# Patient Record
Sex: FEMALE | Race: BLACK OR AFRICAN AMERICAN | NOT HISPANIC OR LATINO | URBAN - METROPOLITAN AREA
[De-identification: names, ages, dates, MRNs, and addresses within clinical notes are randomized per-mention and may not be internally consistent; named-entity substitution may affect disease eponyms.]

---

## 2018-02-22 ENCOUNTER — EMERGENCY (EMERGENCY)
Facility: HOSPITAL | Age: 58
LOS: 1 days | Discharge: HOME | End: 2018-02-22
Attending: EMERGENCY MEDICINE | Admitting: EMERGENCY MEDICINE
Payer: COMMERCIAL

## 2018-02-22 VITALS
DIASTOLIC BLOOD PRESSURE: 82 MMHG | RESPIRATION RATE: 16 BRPM | TEMPERATURE: 98 F | SYSTOLIC BLOOD PRESSURE: 132 MMHG | HEART RATE: 67 BPM | OXYGEN SATURATION: 100 %

## 2018-02-22 VITALS
HEART RATE: 70 BPM | OXYGEN SATURATION: 99 % | TEMPERATURE: 98 F | SYSTOLIC BLOOD PRESSURE: 161 MMHG | DIASTOLIC BLOOD PRESSURE: 90 MMHG | RESPIRATION RATE: 16 BRPM

## 2018-02-22 DIAGNOSIS — I10 ESSENTIAL (PRIMARY) HYPERTENSION: ICD-10-CM

## 2018-02-22 DIAGNOSIS — Z88.2 ALLERGY STATUS TO SULFONAMIDES: ICD-10-CM

## 2018-02-22 DIAGNOSIS — Z79.1 LONG TERM (CURRENT) USE OF NON-STEROIDAL ANTI-INFLAMMATORIES (NSAID): ICD-10-CM

## 2018-02-22 DIAGNOSIS — Z88.0 ALLERGY STATUS TO PENICILLIN: ICD-10-CM

## 2018-02-22 DIAGNOSIS — M79.602 PAIN IN LEFT ARM: ICD-10-CM

## 2018-02-22 DIAGNOSIS — R07.89 OTHER CHEST PAIN: ICD-10-CM

## 2018-02-22 DIAGNOSIS — Z91.013 ALLERGY TO SEAFOOD: ICD-10-CM

## 2018-02-22 LAB
ALBUMIN SERPL ELPH-MCNC: 4.2 G/DL — SIGNIFICANT CHANGE UP (ref 3.3–5)
ALP SERPL-CCNC: 82 U/L — SIGNIFICANT CHANGE UP (ref 40–120)
ALT FLD-CCNC: 8 U/L — LOW (ref 10–45)
ANION GAP SERPL CALC-SCNC: 8 MMOL/L — SIGNIFICANT CHANGE UP (ref 5–17)
AST SERPL-CCNC: 15 U/L — SIGNIFICANT CHANGE UP (ref 10–40)
BASOPHILS NFR BLD AUTO: 0.6 % — SIGNIFICANT CHANGE UP (ref 0–2)
BILIRUB SERPL-MCNC: 0.3 MG/DL — SIGNIFICANT CHANGE UP (ref 0.2–1.2)
BUN SERPL-MCNC: 13 MG/DL — SIGNIFICANT CHANGE UP (ref 7–23)
CALCIUM SERPL-MCNC: 9.4 MG/DL — SIGNIFICANT CHANGE UP (ref 8.4–10.5)
CHLORIDE SERPL-SCNC: 100 MMOL/L — SIGNIFICANT CHANGE UP (ref 96–108)
CK MB CFR SERPL CALC: 1.7 NG/ML — SIGNIFICANT CHANGE UP (ref 0–6.7)
CK SERPL-CCNC: 95 U/L — SIGNIFICANT CHANGE UP (ref 25–170)
CO2 SERPL-SCNC: 30 MMOL/L — SIGNIFICANT CHANGE UP (ref 22–31)
CREAT SERPL-MCNC: 0.74 MG/DL — SIGNIFICANT CHANGE UP (ref 0.5–1.3)
EOSINOPHIL NFR BLD AUTO: 3.1 % — SIGNIFICANT CHANGE UP (ref 0–6)
EXTRA BLUE TOP TUBE: SIGNIFICANT CHANGE UP
EXTRA SST TUBE: SIGNIFICANT CHANGE UP
GLUCOSE SERPL-MCNC: 94 MG/DL — SIGNIFICANT CHANGE UP (ref 70–99)
HCT VFR BLD CALC: 35.4 % — SIGNIFICANT CHANGE UP (ref 34.5–45)
HGB BLD-MCNC: 11.4 G/DL — LOW (ref 11.5–15.5)
LYMPHOCYTES # BLD AUTO: 32.9 % — SIGNIFICANT CHANGE UP (ref 13–44)
MCHC RBC-ENTMCNC: 22.8 PG — LOW (ref 27–34)
MCHC RBC-ENTMCNC: 32.2 G/DL — SIGNIFICANT CHANGE UP (ref 32–36)
MCV RBC AUTO: 70.9 FL — LOW (ref 80–100)
MONOCYTES NFR BLD AUTO: 5.4 % — SIGNIFICANT CHANGE UP (ref 2–14)
NEUTROPHILS NFR BLD AUTO: 58 % — SIGNIFICANT CHANGE UP (ref 43–77)
PLATELET # BLD AUTO: 259 K/UL — SIGNIFICANT CHANGE UP (ref 150–400)
POTASSIUM SERPL-MCNC: 4 MMOL/L — SIGNIFICANT CHANGE UP (ref 3.5–5.3)
POTASSIUM SERPL-SCNC: 4 MMOL/L — SIGNIFICANT CHANGE UP (ref 3.5–5.3)
PROT SERPL-MCNC: 7.5 G/DL — SIGNIFICANT CHANGE UP (ref 6–8.3)
RBC # BLD: 4.99 M/UL — SIGNIFICANT CHANGE UP (ref 3.8–5.2)
RBC # FLD: 14.6 % — SIGNIFICANT CHANGE UP (ref 10.3–16.9)
SODIUM SERPL-SCNC: 138 MMOL/L — SIGNIFICANT CHANGE UP (ref 135–145)
TROPONIN T SERPL-MCNC: <0.01 NG/ML — SIGNIFICANT CHANGE UP (ref 0–0.01)
WBC # BLD: 7 K/UL — SIGNIFICANT CHANGE UP (ref 3.8–10.5)
WBC # FLD AUTO: 7 K/UL — SIGNIFICANT CHANGE UP (ref 3.8–10.5)

## 2018-02-22 PROCEDURE — 99284 EMERGENCY DEPT VISIT MOD MDM: CPT | Mod: 25

## 2018-02-22 PROCEDURE — 85025 COMPLETE CBC W/AUTO DIFF WBC: CPT

## 2018-02-22 PROCEDURE — 80053 COMPREHEN METABOLIC PANEL: CPT

## 2018-02-22 PROCEDURE — 36415 COLL VENOUS BLD VENIPUNCTURE: CPT

## 2018-02-22 PROCEDURE — 82553 CREATINE MB FRACTION: CPT

## 2018-02-22 PROCEDURE — 86140 C-REACTIVE PROTEIN: CPT

## 2018-02-22 PROCEDURE — 84484 ASSAY OF TROPONIN QUANT: CPT

## 2018-02-22 PROCEDURE — 75574 CT ANGIO HRT W/3D IMAGE: CPT

## 2018-02-22 PROCEDURE — 99285 EMERGENCY DEPT VISIT HI MDM: CPT | Mod: 25

## 2018-02-22 PROCEDURE — 93005 ELECTROCARDIOGRAM TRACING: CPT

## 2018-02-22 PROCEDURE — 75574 CT ANGIO HRT W/3D IMAGE: CPT | Mod: 26

## 2018-02-22 PROCEDURE — 82550 ASSAY OF CK (CPK): CPT

## 2018-02-22 PROCEDURE — 93010 ELECTROCARDIOGRAM REPORT: CPT

## 2018-02-22 PROCEDURE — 85652 RBC SED RATE AUTOMATED: CPT

## 2018-02-22 RX ORDER — IBUPROFEN 200 MG
1 TABLET ORAL
Qty: 30 | Refills: 0
Start: 2018-02-22

## 2018-02-22 RX ORDER — AMLODIPINE BESYLATE 2.5 MG/1
1 TABLET ORAL
Qty: 30 | Refills: 0
Start: 2018-02-22 | End: 2018-03-23

## 2018-02-22 RX ORDER — METOPROLOL TARTRATE 50 MG
50 TABLET ORAL ONCE
Qty: 0 | Refills: 0 | Status: COMPLETED | OUTPATIENT
Start: 2018-02-22 | End: 2018-02-22

## 2018-02-22 RX ORDER — TRAMADOL HYDROCHLORIDE 50 MG/1
1 TABLET ORAL
Qty: 18 | Refills: 0
Start: 2018-02-22 | End: 2018-02-24

## 2018-02-22 RX ORDER — TRAMADOL HYDROCHLORIDE 50 MG/1
50 TABLET ORAL ONCE
Qty: 0 | Refills: 0 | Status: DISCONTINUED | OUTPATIENT
Start: 2018-02-22 | End: 2018-02-22

## 2018-02-22 RX ADMIN — Medication 50 MILLIGRAM(S): at 15:56

## 2018-02-22 RX ADMIN — TRAMADOL HYDROCHLORIDE 50 MILLIGRAM(S): 50 TABLET ORAL at 20:05

## 2018-02-22 NOTE — ED PROVIDER NOTE - OBJECTIVE STATEMENT
Pt w/ PMHx HTN, HPL, pre-DM, SLE vs RA, non compliant w/ all meds for approx 6 moths, PSHx ankle surgery, p/w 2-3 days of chest pain and L arm (shoulder / upper) pain. Pt reports the L arm pain is more frequent and uncomfortable. The pain is intermittent, worse with walking, laying down, and movement of the L arm. The pain is not pleuritic. The pain is squeezing in nature. The pain is 8/10 on exam. No analgesia taken. Pt reports associated mild SOB. No diaphoresis, lightheadedness, palpitations, n/v.

## 2018-02-22 NOTE — ED PROVIDER NOTE - PROGRESS NOTE DETAILS
No acute pathology on CT. Pt notified on incidental findings, given a copy of results, and instructed to f/u PCP, pulm. D/w pt at length importance of compliance w/ medications, regular medical care. Will start Norvasc for HTN. Instructed f/u PCP for further management of chronic medical conditions.

## 2018-02-22 NOTE — ED ADULT NURSE NOTE - OBJECTIVE STATEMENT
Pt presents to ED with c/o L sided chest pain radiating down L arm x2 days worsening today with some associated SOB. Denies dizziness/nv/sweating, no SOB noted on arrival to ED, speaking in full sentences. States she has been diagnosed with HTN but has not been compliant with medications. Denies fever/chills/cough. EKG done.

## 2018-02-22 NOTE — ED PROVIDER NOTE - MUSCULOSKELETAL, MLM
Range of motion is not limited. No signs of trauma to LUE / chest / upper back. ROM of the arm / shoulder reproduces some pain. Non tender. NVI distally. No swelling nor changes in calor / pallor

## 2018-02-22 NOTE — ED PROVIDER NOTE - CARE PLAN
Principal Discharge DX:	Chest pain, unspecified type  Secondary Diagnosis:	Pain of left upper extremity

## 2018-02-22 NOTE — ED PROVIDER NOTE - MEDICAL DECISION MAKING DETAILS
Pt p/w int chest and arm pain. Pain reproducible and worse w/ movement. Pt also, obese, multiple risk factors for CAD, and non compliant w/ meds. DDx includes MSK pain, RA / SLE flare, HTN-induced, angina, ACS, less likely other pathology. H&P not c/w dissection, PTX. No EKG findings to suggest pericardial effusion. H&P not c/w PE. Wells low risk. Exam not c/w DVT. Check labs, CTA coronary. Dispo pending w/u and clinical status

## 2018-07-23 NOTE — ED ADULT NURSE NOTE - CAS EDN DISCHARGE INTERVENTIONS
----- Message from CHEYENNE Lerma sent at 7/20/2018  4:06 PM CDT -----  Diabetes is well controlled.  Her labs are looking better.  Her kidney function has also improved from 58 to 75.    
Letter mailed to pt with results.  Pt to call the office if any questions.  
IV discontinued, cath removed intact

## 2020-12-27 PROBLEM — Z00.00 ENCOUNTER FOR PREVENTIVE HEALTH EXAMINATION: Status: ACTIVE | Noted: 2020-12-27

## 2020-12-30 ENCOUNTER — APPOINTMENT (OUTPATIENT)
Dept: BARIATRICS | Facility: CLINIC | Age: 60
End: 2020-12-30
Payer: COMMERCIAL

## 2020-12-30 VITALS
BODY MASS INDEX: 38.44 KG/M2 | HEART RATE: 79 BPM | OXYGEN SATURATION: 100 % | TEMPERATURE: 97.3 F | DIASTOLIC BLOOD PRESSURE: 83 MMHG | SYSTOLIC BLOOD PRESSURE: 153 MMHG | HEIGHT: 69 IN | WEIGHT: 259.5 LBS

## 2020-12-30 DIAGNOSIS — Z78.9 OTHER SPECIFIED HEALTH STATUS: ICD-10-CM

## 2020-12-30 DIAGNOSIS — E78.00 PURE HYPERCHOLESTEROLEMIA, UNSPECIFIED: ICD-10-CM

## 2020-12-30 DIAGNOSIS — I10 ESSENTIAL (PRIMARY) HYPERTENSION: ICD-10-CM

## 2020-12-30 DIAGNOSIS — J45.909 UNSPECIFIED ASTHMA, UNCOMPLICATED: ICD-10-CM

## 2020-12-30 DIAGNOSIS — E11.9 TYPE 2 DIABETES MELLITUS W/OUT COMPLICATIONS: ICD-10-CM

## 2020-12-30 PROCEDURE — 99203 OFFICE O/P NEW LOW 30 MIN: CPT

## 2020-12-30 PROCEDURE — 99072 ADDL SUPL MATRL&STAF TM PHE: CPT

## 2020-12-30 NOTE — ASSESSMENT
[FreeTextEntry1] : Will order UGI series and blood work, have her see our dietician and psych and then follow up after.

## 2020-12-30 NOTE — PHYSICAL EXAM
[Obese] : obese [Normal] : affect appropriate [de-identified] : gynoid distribution, bulk of weight in buttocks, hips and lower extremities, with narrow torso

## 2020-12-30 NOTE — ADDENDUM
[FreeTextEntry1] : This note was written by Ethel Dillard on 12/30/2020 acting as scribe for Dr. Salcido.

## 2020-12-30 NOTE — HISTORY OF PRESENT ILLNESS
[de-identified] : Maddi Solorzano is a 59 y/o F who works in  and comes because of severe obesity, which is exacerbating her early DM, HTN, HLD and most importantly, DJD in her knees, which has changed her from an active to an inactive lifestyle. She has been unable to maintain weight loss and has had weight gain, which has exacerbated knee pain and she's recently had bilateral knee injections. Has been told that weight loss is the best treatment for knee pain and with her gynoid distribution I think this is the best approach. Denies GERD. Denies drug and alcohol abuse. I think she would benefit from a sleeve gastrectomy. Current medications include albuterol for reactive airways, amlodipine for blood pressure and NSAIDs for her knees. On physical exam, patient has gynoid distribution with most weight in her buttocks, hips and lower extremities, but actually has a narrow torso. I think without intervention, Maddi is at a high risk for disability and I think weight loss surgery is probably much more likely to lead to long term success. Pt is s/p ankle surgery and states she has a plate in her R ankle. Will order UGI series and blood work, have her see our dietician and psych and then follow up after.

## 2020-12-30 NOTE — END OF VISIT
[FreeTextEntry3] : All medical record entries made by the Scribe were at my, Dr. Salcido's, discretion and personally dictated by me on 12/30/2020. I have reviewed the chart and agree that the record accurately reflects my personal performance of the history, physical exam, assessment and plan. I have also personally directed, reviewed and agreed to the chart.

## 2021-03-09 ENCOUNTER — APPOINTMENT (OUTPATIENT)
Dept: BARIATRICS | Facility: CLINIC | Age: 61
End: 2021-03-09
Payer: COMMERCIAL

## 2021-03-09 VITALS — WEIGHT: 256.8 LBS | HEIGHT: 69 IN | BODY MASS INDEX: 38.03 KG/M2

## 2021-03-09 PROCEDURE — 98968 PH1 ASSMT&MGMT NQHP 21-30: CPT

## 2021-03-29 ENCOUNTER — APPOINTMENT (OUTPATIENT)
Dept: BARIATRICS | Facility: CLINIC | Age: 61
End: 2021-03-29
Payer: COMMERCIAL

## 2021-03-29 ENCOUNTER — NON-APPOINTMENT (OUTPATIENT)
Age: 61
End: 2021-03-29

## 2021-03-29 VITALS — HEIGHT: 69 IN | BODY MASS INDEX: 38.06 KG/M2 | WEIGHT: 257 LBS

## 2021-03-29 PROCEDURE — 98968 PH1 ASSMT&MGMT NQHP 21-30: CPT

## 2021-03-31 LAB
25(OH)D3 SERPL-MCNC: 23.7 NG/ML
ALBUMIN SERPL ELPH-MCNC: 3.8 G/DL
ALP BLD-CCNC: 89 U/L
ALT SERPL-CCNC: 12 U/L
ANION GAP SERPL CALC-SCNC: 13 MMOL/L
AST SERPL-CCNC: 14 U/L
BASOPHILS # BLD AUTO: 0.04 K/UL
BASOPHILS NFR BLD AUTO: 0.7 %
BILIRUB SERPL-MCNC: 0.4 MG/DL
BUN SERPL-MCNC: 14 MG/DL
CALCIUM SERPL-MCNC: 9.4 MG/DL
CHLORIDE SERPL-SCNC: 107 MMOL/L
CO2 SERPL-SCNC: 24 MMOL/L
CREAT SERPL-MCNC: 0.69 MG/DL
CRP SERPL HS-MCNC: 1.22 MG/L
EOSINOPHIL # BLD AUTO: 0.16 K/UL
EOSINOPHIL NFR BLD AUTO: 2.8 %
ESTIMATED AVERAGE GLUCOSE: 120 MG/DL
FOLATE SERPL-MCNC: 7.1 NG/ML
GLUCOSE BS SERPL-MCNC: 97 MG/DL
GLUCOSE SERPL-MCNC: 101 MG/DL
HBA1C MFR BLD HPLC: 5.8 %
HCT VFR BLD CALC: 37.1 %
HGB BLD-MCNC: 11.4 G/DL
IMM GRANULOCYTES NFR BLD AUTO: 0.2 %
INSULIN P FAST SERPL-ACNC: 7.1 UU/ML
IRON SERPL-MCNC: 52 UG/DL
LYMPHOCYTES # BLD AUTO: 1.74 K/UL
LYMPHOCYTES NFR BLD AUTO: 31 %
MAN DIFF?: NORMAL
MCHC RBC-ENTMCNC: 22.9 PG
MCHC RBC-ENTMCNC: 30.7 GM/DL
MCV RBC AUTO: 74.5 FL
MONOCYTES # BLD AUTO: 0.35 K/UL
MONOCYTES NFR BLD AUTO: 6.2 %
NEUTROPHILS # BLD AUTO: 3.32 K/UL
NEUTROPHILS NFR BLD AUTO: 59.1 %
PLATELET # BLD AUTO: 239 K/UL
POTASSIUM SERPL-SCNC: 3.9 MMOL/L
PROT SERPL-MCNC: 6.6 G/DL
RBC # BLD: 4.98 M/UL
RBC # FLD: 14.6 %
SODIUM SERPL-SCNC: 145 MMOL/L
TSH SERPL-ACNC: 2.06 UIU/ML
VIT B12 SERPL-MCNC: 244 PG/ML
WBC # FLD AUTO: 5.62 K/UL

## 2021-04-07 LAB — VIT B1 SERPL-MCNC: 67.5 NMOL/L

## 2021-04-08 LAB — VIT A SERPL-MCNC: 30.8 UG/DL

## 2021-04-12 RX ORDER — ERGOCALCIFEROL 1.25 MG/1
1.25 MG CAPSULE, LIQUID FILLED ORAL
Qty: 12 | Refills: 0 | Status: ACTIVE | COMMUNITY
Start: 2021-04-12 | End: 1900-01-01

## 2021-04-22 ENCOUNTER — RESULT REVIEW (OUTPATIENT)
Age: 61
End: 2021-04-22

## 2021-04-22 ENCOUNTER — OUTPATIENT (OUTPATIENT)
Dept: OUTPATIENT SERVICES | Facility: HOSPITAL | Age: 61
LOS: 1 days | End: 2021-04-22
Payer: COMMERCIAL

## 2021-04-22 PROCEDURE — 71046 X-RAY EXAM CHEST 2 VIEWS: CPT | Mod: 26

## 2021-04-22 PROCEDURE — 71046 X-RAY EXAM CHEST 2 VIEWS: CPT

## 2021-04-28 ENCOUNTER — APPOINTMENT (OUTPATIENT)
Dept: BARIATRICS | Facility: CLINIC | Age: 61
End: 2021-04-28
Payer: COMMERCIAL

## 2021-04-28 VITALS — HEIGHT: 69 IN | BODY MASS INDEX: 38.23 KG/M2 | WEIGHT: 258.13 LBS

## 2021-04-28 PROCEDURE — 99072 ADDL SUPL MATRL&STAF TM PHE: CPT

## 2021-04-28 PROCEDURE — 99213 OFFICE O/P EST LOW 20 MIN: CPT

## 2021-04-28 NOTE — HISTORY OF PRESENT ILLNESS
[Home] : at home, [unfilled] , at the time of the visit. [Medical Office: ([facility])___] : at the medical office located in  [Verbal consent obtained from patient] : the patient, [unfilled] [de-identified] : Ms. Solorzano presents today for a final visit for surgery scheduled on 5/11/21. Alternatives, risks and benefits discussed. All questions answered and preop instructions given. BSTOP, use and rationale of non-opioid medications for pain management explained, patient verbalized understanding.

## 2021-04-28 NOTE — ASSESSMENT
[FreeTextEntry1] : 61 year old female presenting for a final visit for sleeve gastrectomy scheduled on 5/11. All questions answered and preop instructions given.

## 2021-04-29 ENCOUNTER — EMERGENCY (EMERGENCY)
Facility: HOSPITAL | Age: 61
LOS: 1 days | Discharge: HOME | End: 2021-04-29
Admitting: EMERGENCY MEDICINE
Payer: COMMERCIAL

## 2021-04-29 VITALS
DIASTOLIC BLOOD PRESSURE: 98 MMHG | OXYGEN SATURATION: 99 % | SYSTOLIC BLOOD PRESSURE: 164 MMHG | RESPIRATION RATE: 16 BRPM | TEMPERATURE: 98 F | HEART RATE: 76 BPM

## 2021-04-29 LAB — SARS-COV-2 RNA SPEC QL NAA+PROBE: SIGNIFICANT CHANGE UP

## 2021-04-29 PROCEDURE — U0003: CPT

## 2021-04-29 PROCEDURE — U0005: CPT

## 2021-04-29 PROCEDURE — 99283 EMERGENCY DEPT VISIT LOW MDM: CPT

## 2021-04-29 PROCEDURE — 99282 EMERGENCY DEPT VISIT SF MDM: CPT

## 2021-04-29 NOTE — ED PROVIDER NOTE - PATIENT PORTAL LINK FT
You can access the FollowMyHealth Patient Portal offered by [facility] by registering at the following website: [email or web address]. By joining [employer]’s FollowMyHealth portal, you will also be able to view your health information using other applications (apps) compatible with our system.

## 2021-05-01 DIAGNOSIS — Z88.8 ALLERGY STATUS TO OTHER DRUGS, MEDICAMENTS AND BIOLOGICAL SUBSTANCES: ICD-10-CM

## 2021-05-01 DIAGNOSIS — Z20.822 CONTACT WITH AND (SUSPECTED) EXPOSURE TO COVID-19: ICD-10-CM

## 2021-05-01 DIAGNOSIS — Z88.0 ALLERGY STATUS TO PENICILLIN: ICD-10-CM

## 2021-05-01 DIAGNOSIS — Z91.013 ALLERGY TO SEAFOOD: ICD-10-CM

## 2021-05-01 DIAGNOSIS — Z88.2 ALLERGY STATUS TO SULFONAMIDES: ICD-10-CM

## 2021-05-03 ENCOUNTER — APPOINTMENT (OUTPATIENT)
Dept: PULMONOLOGY | Facility: CLINIC | Age: 61
End: 2021-05-03
Payer: COMMERCIAL

## 2021-05-03 ENCOUNTER — NON-APPOINTMENT (OUTPATIENT)
Age: 61
End: 2021-05-03

## 2021-05-03 VITALS
OXYGEN SATURATION: 98 % | WEIGHT: 258 LBS | HEIGHT: 69 IN | DIASTOLIC BLOOD PRESSURE: 88 MMHG | BODY MASS INDEX: 38.21 KG/M2 | HEART RATE: 87 BPM | TEMPERATURE: 98 F | SYSTOLIC BLOOD PRESSURE: 108 MMHG

## 2021-05-03 DIAGNOSIS — G47.33 OBSTRUCTIVE SLEEP APNEA (ADULT) (PEDIATRIC): ICD-10-CM

## 2021-05-03 DIAGNOSIS — R91.1 SOLITARY PULMONARY NODULE: ICD-10-CM

## 2021-05-03 PROCEDURE — 99072 ADDL SUPL MATRL&STAF TM PHE: CPT

## 2021-05-03 PROCEDURE — 94729 DIFFUSING CAPACITY: CPT

## 2021-05-03 PROCEDURE — 99205 OFFICE O/P NEW HI 60 MIN: CPT | Mod: 25

## 2021-05-03 PROCEDURE — 94060 EVALUATION OF WHEEZING: CPT

## 2021-05-03 PROCEDURE — 94727 GAS DIL/WSHOT DETER LNG VOL: CPT

## 2021-05-03 RX ORDER — HYDROXYCHLOROQUINE SULFATE 200 MG/1
TABLET ORAL
Refills: 0 | Status: ACTIVE | COMMUNITY

## 2021-05-03 RX ORDER — ALBUTEROL SULFATE 90 UG/1
108 (90 BASE) INHALANT RESPIRATORY (INHALATION)
Qty: 1 | Refills: 0 | Status: ACTIVE | COMMUNITY
Start: 2021-05-03 | End: 1900-01-01

## 2021-05-03 RX ORDER — MELOXICAM 15 MG/1
TABLET ORAL
Refills: 0 | Status: ACTIVE | COMMUNITY

## 2021-05-03 RX ORDER — EZETIMIBE 10 MG/1
10 TABLET ORAL
Qty: 90 | Refills: 0 | Status: ACTIVE | COMMUNITY
Start: 2021-04-28

## 2021-05-03 NOTE — PHYSICAL EXAM
[No Acute Distress] : no acute distress [Normal Rate/Rhythm] : normal rate/rhythm [Normal S1, S2] : normal s1, s2 [No Murmurs] : no murmurs [No Resp Distress] : no resp distress [Clear to Auscultation Bilaterally] : clear to auscultation bilaterally [No Clubbing] : no clubbing [No Edema] : no edema

## 2021-05-08 ENCOUNTER — LABORATORY RESULT (OUTPATIENT)
Age: 61
End: 2021-05-08

## 2021-05-10 ENCOUNTER — TRANSCRIPTION ENCOUNTER (OUTPATIENT)
Age: 61
End: 2021-05-10

## 2021-05-10 VITALS
SYSTOLIC BLOOD PRESSURE: 134 MMHG | TEMPERATURE: 97 F | DIASTOLIC BLOOD PRESSURE: 77 MMHG | OXYGEN SATURATION: 97 % | WEIGHT: 255.07 LBS | RESPIRATION RATE: 16 BRPM | HEIGHT: 68 IN | HEART RATE: 83 BPM

## 2021-05-11 ENCOUNTER — INPATIENT (INPATIENT)
Facility: HOSPITAL | Age: 61
LOS: 1 days | Discharge: HOME | DRG: 621 | End: 2021-05-13
Attending: SURGERY | Admitting: SURGERY
Payer: COMMERCIAL

## 2021-05-11 ENCOUNTER — APPOINTMENT (OUTPATIENT)
Dept: BARIATRICS | Facility: HOSPITAL | Age: 61
End: 2021-05-11

## 2021-05-11 ENCOUNTER — RESULT REVIEW (OUTPATIENT)
Age: 61
End: 2021-05-11

## 2021-05-11 DIAGNOSIS — Z98.890 OTHER SPECIFIED POSTPROCEDURAL STATES: Chronic | ICD-10-CM

## 2021-05-11 LAB
HBV SURFACE AB SER-ACNC: SIGNIFICANT CHANGE UP
HBV SURFACE AG SER-ACNC: SIGNIFICANT CHANGE UP
HCT VFR BLD CALC: 36.1 % — SIGNIFICANT CHANGE UP (ref 34.5–45)
HCV AB S/CO SERPL IA: 0.04 S/CO — SIGNIFICANT CHANGE UP
HCV AB SERPL-IMP: SIGNIFICANT CHANGE UP
HGB BLD-MCNC: 11 G/DL — LOW (ref 11.5–15.5)
HIV 1+2 AB+HIV1 P24 AG SERPL QL IA: SIGNIFICANT CHANGE UP
MCHC RBC-ENTMCNC: 22.2 PG — LOW (ref 27–34)
MCHC RBC-ENTMCNC: 30.5 GM/DL — LOW (ref 32–36)
MCV RBC AUTO: 72.9 FL — LOW (ref 80–100)
NRBC # BLD: 0 /100 WBCS — SIGNIFICANT CHANGE UP (ref 0–0)
PLATELET # BLD AUTO: 234 K/UL — SIGNIFICANT CHANGE UP (ref 150–400)
RBC # BLD: 4.95 M/UL — SIGNIFICANT CHANGE UP (ref 3.8–5.2)
RBC # FLD: 14.5 % — SIGNIFICANT CHANGE UP (ref 10.3–14.5)
WBC # BLD: 9.21 K/UL — SIGNIFICANT CHANGE UP (ref 3.8–10.5)
WBC # FLD AUTO: 9.21 K/UL — SIGNIFICANT CHANGE UP (ref 3.8–10.5)

## 2021-05-11 PROCEDURE — 88307 TISSUE EXAM BY PATHOLOGIST: CPT | Mod: 26

## 2021-05-11 PROCEDURE — 39540 REPAIR OF DIAPHRAGM HERNIA: CPT

## 2021-05-11 PROCEDURE — 43775 LAP SLEEVE GASTRECTOMY: CPT

## 2021-05-11 RX ORDER — ONDANSETRON 8 MG/1
4 TABLET, FILM COATED ORAL EVERY 6 HOURS
Refills: 0 | Status: DISCONTINUED | OUTPATIENT
Start: 2021-05-11 | End: 2021-05-13

## 2021-05-11 RX ORDER — ACETAMINOPHEN 500 MG
1000 TABLET ORAL ONCE
Refills: 0 | Status: COMPLETED | OUTPATIENT
Start: 2021-05-12 | End: 2021-05-12

## 2021-05-11 RX ORDER — KETOROLAC TROMETHAMINE 30 MG/ML
15 SYRINGE (ML) INJECTION EVERY 6 HOURS
Refills: 0 | Status: DISCONTINUED | OUTPATIENT
Start: 2021-05-11 | End: 2021-05-13

## 2021-05-11 RX ORDER — HYDROMORPHONE HYDROCHLORIDE 2 MG/ML
0.25 INJECTION INTRAMUSCULAR; INTRAVENOUS; SUBCUTANEOUS ONCE
Refills: 0 | Status: DISCONTINUED | OUTPATIENT
Start: 2021-05-11 | End: 2021-05-11

## 2021-05-11 RX ORDER — SCOPALAMINE 1 MG/3D
1 PATCH, EXTENDED RELEASE TRANSDERMAL ONCE
Refills: 0 | Status: COMPLETED | OUTPATIENT
Start: 2021-05-11 | End: 2021-05-11

## 2021-05-11 RX ORDER — ACETAMINOPHEN 500 MG
650 TABLET ORAL EVERY 6 HOURS
Refills: 0 | Status: DISCONTINUED | OUTPATIENT
Start: 2021-05-11 | End: 2021-05-13

## 2021-05-11 RX ORDER — GABAPENTIN 400 MG/1
300 CAPSULE ORAL ONCE
Refills: 0 | Status: COMPLETED | OUTPATIENT
Start: 2021-05-11 | End: 2021-05-11

## 2021-05-11 RX ORDER — ACETAMINOPHEN 500 MG
1000 TABLET ORAL ONCE
Refills: 0 | Status: COMPLETED | OUTPATIENT
Start: 2021-05-11 | End: 2021-05-11

## 2021-05-11 RX ORDER — ALBUTEROL 90 UG/1
2 AEROSOL, METERED ORAL EVERY 6 HOURS
Refills: 0 | Status: DISCONTINUED | OUTPATIENT
Start: 2021-05-11 | End: 2021-05-13

## 2021-05-11 RX ORDER — ONDANSETRON 8 MG/1
4 TABLET, FILM COATED ORAL ONCE
Refills: 0 | Status: COMPLETED | OUTPATIENT
Start: 2021-05-11 | End: 2021-05-11

## 2021-05-11 RX ORDER — ENOXAPARIN SODIUM 100 MG/ML
40 INJECTION SUBCUTANEOUS ONCE
Refills: 0 | Status: COMPLETED | OUTPATIENT
Start: 2021-05-11 | End: 2021-05-11

## 2021-05-11 RX ORDER — ALBUTEROL 90 UG/1
2.5 AEROSOL, METERED ORAL ONCE
Refills: 0 | Status: COMPLETED | OUTPATIENT
Start: 2021-05-11 | End: 2021-05-11

## 2021-05-11 RX ORDER — SODIUM CHLORIDE 9 MG/ML
1000 INJECTION, SOLUTION INTRAVENOUS
Refills: 0 | Status: DISCONTINUED | OUTPATIENT
Start: 2021-05-11 | End: 2021-05-12

## 2021-05-11 RX ORDER — HYDROMORPHONE HYDROCHLORIDE 2 MG/ML
0.5 INJECTION INTRAMUSCULAR; INTRAVENOUS; SUBCUTANEOUS
Refills: 0 | Status: DISCONTINUED | OUTPATIENT
Start: 2021-05-11 | End: 2021-05-11

## 2021-05-11 RX ORDER — AMLODIPINE BESYLATE 2.5 MG/1
5 TABLET ORAL DAILY
Refills: 0 | Status: DISCONTINUED | OUTPATIENT
Start: 2021-05-11 | End: 2021-05-13

## 2021-05-11 RX ORDER — METOCLOPRAMIDE HCL 10 MG
10 TABLET ORAL ONCE
Refills: 0 | Status: COMPLETED | OUTPATIENT
Start: 2021-05-11 | End: 2021-05-11

## 2021-05-11 RX ORDER — HYDROMORPHONE HYDROCHLORIDE 2 MG/ML
0.25 INJECTION INTRAMUSCULAR; INTRAVENOUS; SUBCUTANEOUS ONCE
Refills: 0 | Status: DISCONTINUED | OUTPATIENT
Start: 2021-05-11 | End: 2021-05-12

## 2021-05-11 RX ORDER — PANTOPRAZOLE SODIUM 20 MG/1
40 TABLET, DELAYED RELEASE ORAL DAILY
Refills: 0 | Status: DISCONTINUED | OUTPATIENT
Start: 2021-05-11 | End: 2021-05-13

## 2021-05-11 RX ADMIN — Medication 1000 MILLIGRAM(S): at 21:20

## 2021-05-11 RX ADMIN — PANTOPRAZOLE SODIUM 40 MILLIGRAM(S): 20 TABLET, DELAYED RELEASE ORAL at 11:00

## 2021-05-11 RX ADMIN — Medication 15 MILLIGRAM(S): at 21:04

## 2021-05-11 RX ADMIN — Medication 1000 MILLIGRAM(S): at 07:38

## 2021-05-11 RX ADMIN — Medication 400 MILLIGRAM(S): at 21:04

## 2021-05-11 RX ADMIN — ENOXAPARIN SODIUM 40 MILLIGRAM(S): 100 INJECTION SUBCUTANEOUS at 07:38

## 2021-05-11 RX ADMIN — ALBUTEROL 2.5 MILLIGRAM(S): 90 AEROSOL, METERED ORAL at 07:39

## 2021-05-11 RX ADMIN — HYDROMORPHONE HYDROCHLORIDE 0.25 MILLIGRAM(S): 2 INJECTION INTRAMUSCULAR; INTRAVENOUS; SUBCUTANEOUS at 16:54

## 2021-05-11 RX ADMIN — Medication 1000 MILLIGRAM(S): at 15:03

## 2021-05-11 RX ADMIN — Medication 10 MILLIGRAM(S): at 19:24

## 2021-05-11 RX ADMIN — HYDROMORPHONE HYDROCHLORIDE 0.5 MILLIGRAM(S): 2 INJECTION INTRAMUSCULAR; INTRAVENOUS; SUBCUTANEOUS at 11:15

## 2021-05-11 RX ADMIN — Medication 15 MILLIGRAM(S): at 21:20

## 2021-05-11 RX ADMIN — SCOPALAMINE 1 PATCH: 1 PATCH, EXTENDED RELEASE TRANSDERMAL at 18:32

## 2021-05-11 RX ADMIN — HYDROMORPHONE HYDROCHLORIDE 0.5 MILLIGRAM(S): 2 INJECTION INTRAMUSCULAR; INTRAVENOUS; SUBCUTANEOUS at 12:10

## 2021-05-11 RX ADMIN — SCOPALAMINE 1 PATCH: 1 PATCH, EXTENDED RELEASE TRANSDERMAL at 07:39

## 2021-05-11 RX ADMIN — Medication 400 MILLIGRAM(S): at 14:33

## 2021-05-11 RX ADMIN — ONDANSETRON 4 MILLIGRAM(S): 8 TABLET, FILM COATED ORAL at 11:01

## 2021-05-11 RX ADMIN — HYDROMORPHONE HYDROCHLORIDE 0.25 MILLIGRAM(S): 2 INJECTION INTRAMUSCULAR; INTRAVENOUS; SUBCUTANEOUS at 16:24

## 2021-05-11 RX ADMIN — ONDANSETRON 4 MILLIGRAM(S): 8 TABLET, FILM COATED ORAL at 16:04

## 2021-05-11 RX ADMIN — ONDANSETRON 4 MILLIGRAM(S): 8 TABLET, FILM COATED ORAL at 11:42

## 2021-05-11 RX ADMIN — GABAPENTIN 300 MILLIGRAM(S): 400 CAPSULE ORAL at 07:38

## 2021-05-11 RX ADMIN — HYDROMORPHONE HYDROCHLORIDE 0.5 MILLIGRAM(S): 2 INJECTION INTRAMUSCULAR; INTRAVENOUS; SUBCUTANEOUS at 11:52

## 2021-05-11 RX ADMIN — HYDROMORPHONE HYDROCHLORIDE 0.5 MILLIGRAM(S): 2 INJECTION INTRAMUSCULAR; INTRAVENOUS; SUBCUTANEOUS at 10:44

## 2021-05-11 RX ADMIN — AMLODIPINE BESYLATE 5 MILLIGRAM(S): 2.5 TABLET ORAL at 18:38

## 2021-05-11 NOTE — BRIEF OPERATIVE NOTE - OPERATION/FINDINGS
Patient under GA. We entered the abdomen with Optiview. Insuflated and placed the trocars in usual fashion. We proceeded with laparoscopic sleeve gastrectomy with 1 black 3 purple, and 1 tan staples. We removed the stomach from the umbilical port, closed fascia with Endosuture, and closed skin with Monocryl.

## 2021-05-11 NOTE — PACU DISCHARGE NOTE - COMMENTS
Pt A&ox4, operative site clean, dry and intact, pain controlled at present; Plan of care endorsed to RN Kelby, 4480.1

## 2021-05-11 NOTE — H&P ADULT - NSICDXPASTMEDICALHX_GEN_ALL_CORE_FT
PAST MEDICAL HISTORY:  Anemia normocytic    Asthma     Connective tissue disease diffuse    HTN (hypertension)     Hyperlipidemia     Morbid obesity     Prediabetes

## 2021-05-11 NOTE — H&P ADULT - HISTORY OF PRESENT ILLNESS
Patient is a 62 y/o F with PMH of HTN, HLD, Lupus, asthma, obesity, and a PSH of knee arthroscopy and vaginal and anal reconstruction after birth, that presents today for her scheduled laparoscopic sleeve gastrectomy with Dr. Salcido. Patient is comfortable and has no complaints.

## 2021-05-11 NOTE — PROGRESS NOTE ADULT - SUBJECTIVE AND OBJECTIVE BOX
Team 2 Surgery Post-Op Note, PCN:     Pre-Op Dx: morbid obesity  Procedure: Laparoscopic sleeve gastrectomy      Surgeon: Loly    Subjective: pt seen at bedside, complains of abdominal pain. admits to nausea, just received medication. denies emesis      Vital Signs Last 24 Hrs  T(C): 36.1 (11 May 2021 10:33), Max: 36.1 (11 May 2021 10:33)  T(F): 97 (11 May 2021 10:33), Max: 97 (11 May 2021 10:33)  HR: 72 (11 May 2021 12:02) (69 - 83)  BP: 159/70 (11 May 2021 12:02) (146/67 - 176/77)  BP(mean): 101 (11 May 2021 12:02) (97 - 119)  RR: 10 (11 May 2021 12:02) (10 - 20)  SpO2: 98% (11 May 2021 12:02) (97% - 99%)    Physical Exam:  General: NAD, resting comfortably in bed  Pulmonary: Nonlabored breathing, no respiratory distress  Cardiovascular: NSR  Abdominal: soft, nondistended, appropriate incisional tenderness, incisions c/d/i  Extremities: WWP, normal strength  Neuro: A/O x 3, no focal deficits, normal sensation  Pulses: palpable distal pulses      LABS:            CAPILLARY BLOOD GLUCOSE                  Radiology and Additional Studies:    Assessment:61y Female s/p laparoscopic sleeve gastrectomy    Plan:  Pain/nausea control PRN  Home meds  Incentive spirometer/OOB/Ambulate  IVF, Diet: BCLD  AM labs  ToV

## 2021-05-11 NOTE — H&P ADULT - ASSESSMENT
Patient is a 60 y/o F with PMH of HTN, HLD, Lupus, asthma, obesity, and a PSH of knee arthroscopy and vaginal and anal reconstruction after birth, that presents today for her scheduled laparoscopic sleeve gastrectomy with Dr. Salcido.    -Proceed to OR  -Admit to Team 2 (Dr. Salcido)  -BCLD  -IVF@150-200  -Zofran/Protonix  -Toradol 15 q6h 12h after OR, Tylenol PRN  -Dietitian consult in AM  -OOB/AMB/SCDs  -CBC6h post-op, no SQH  -AM labs

## 2021-05-11 NOTE — H&P ADULT - NSHPPHYSICALEXAM_GEN_ALL_CORE
General: NAD, sitting comfortably in chair  HEENT: NC/AT, EOMI, MMM  Resp: Non-labored breathing on RA  CV: Palpable distal pulses  Abdomen: soft, obese, non-distended, non-tender, no surgical scars  Extremities: warm, non-edematous

## 2021-05-12 LAB
ANION GAP SERPL CALC-SCNC: 10 MMOL/L — SIGNIFICANT CHANGE UP (ref 5–17)
BUN SERPL-MCNC: 8 MG/DL — SIGNIFICANT CHANGE UP (ref 7–23)
CALCIUM SERPL-MCNC: 9.4 MG/DL — SIGNIFICANT CHANGE UP (ref 8.4–10.5)
CHLORIDE SERPL-SCNC: 100 MMOL/L — SIGNIFICANT CHANGE UP (ref 96–108)
CO2 SERPL-SCNC: 25 MMOL/L — SIGNIFICANT CHANGE UP (ref 22–31)
COVID-19 SPIKE DOMAIN AB INTERP: NEGATIVE — SIGNIFICANT CHANGE UP
COVID-19 SPIKE DOMAIN ANTIBODY RESULT: 0.4 U/ML — SIGNIFICANT CHANGE UP
CREAT SERPL-MCNC: 0.7 MG/DL — SIGNIFICANT CHANGE UP (ref 0.5–1.3)
GLUCOSE SERPL-MCNC: 100 MG/DL — HIGH (ref 70–99)
HCT VFR BLD CALC: 34 % — LOW (ref 34.5–45)
HGB BLD-MCNC: 10.8 G/DL — LOW (ref 11.5–15.5)
MAGNESIUM SERPL-MCNC: 1.9 MG/DL — SIGNIFICANT CHANGE UP (ref 1.6–2.6)
MCHC RBC-ENTMCNC: 22.5 PG — LOW (ref 27–34)
MCHC RBC-ENTMCNC: 31.8 GM/DL — LOW (ref 32–36)
MCV RBC AUTO: 70.8 FL — LOW (ref 80–100)
NRBC # BLD: 0 /100 WBCS — SIGNIFICANT CHANGE UP (ref 0–0)
PHOSPHATE SERPL-MCNC: 2.8 MG/DL — SIGNIFICANT CHANGE UP (ref 2.5–4.5)
PLATELET # BLD AUTO: 228 K/UL — SIGNIFICANT CHANGE UP (ref 150–400)
POTASSIUM SERPL-MCNC: 3.8 MMOL/L — SIGNIFICANT CHANGE UP (ref 3.5–5.3)
POTASSIUM SERPL-SCNC: 3.8 MMOL/L — SIGNIFICANT CHANGE UP (ref 3.5–5.3)
RBC # BLD: 4.8 M/UL — SIGNIFICANT CHANGE UP (ref 3.8–5.2)
RBC # FLD: 14.1 % — SIGNIFICANT CHANGE UP (ref 10.3–14.5)
SARS-COV-2 IGG+IGM SERPL QL IA: 0.4 U/ML — SIGNIFICANT CHANGE UP
SARS-COV-2 IGG+IGM SERPL QL IA: NEGATIVE — SIGNIFICANT CHANGE UP
SODIUM SERPL-SCNC: 135 MMOL/L — SIGNIFICANT CHANGE UP (ref 135–145)
WBC # BLD: 7.91 K/UL — SIGNIFICANT CHANGE UP (ref 3.8–10.5)
WBC # FLD AUTO: 7.91 K/UL — SIGNIFICANT CHANGE UP (ref 3.8–10.5)

## 2021-05-12 RX ORDER — DEXAMETHASONE 0.5 MG/5ML
6 ELIXIR ORAL ONCE
Refills: 0 | Status: COMPLETED | OUTPATIENT
Start: 2021-05-12 | End: 2021-05-12

## 2021-05-12 RX ORDER — SODIUM CHLORIDE 9 MG/ML
1000 INJECTION, SOLUTION INTRAVENOUS
Refills: 0 | Status: DISCONTINUED | OUTPATIENT
Start: 2021-05-12 | End: 2021-05-13

## 2021-05-12 RX ORDER — POTASSIUM CHLORIDE 20 MEQ
20 PACKET (EA) ORAL ONCE
Refills: 0 | Status: COMPLETED | OUTPATIENT
Start: 2021-05-12 | End: 2021-05-12

## 2021-05-12 RX ORDER — HYDROMORPHONE HYDROCHLORIDE 2 MG/ML
0.5 INJECTION INTRAMUSCULAR; INTRAVENOUS; SUBCUTANEOUS ONCE
Refills: 0 | Status: DISCONTINUED | OUTPATIENT
Start: 2021-05-12 | End: 2021-05-12

## 2021-05-12 RX ORDER — MAGNESIUM SULFATE 500 MG/ML
1 VIAL (ML) INJECTION ONCE
Refills: 0 | Status: COMPLETED | OUTPATIENT
Start: 2021-05-12 | End: 2021-05-12

## 2021-05-12 RX ADMIN — Medication 15 MILLIGRAM(S): at 16:29

## 2021-05-12 RX ADMIN — SODIUM CHLORIDE 75 MILLILITER(S): 9 INJECTION, SOLUTION INTRAVENOUS at 14:11

## 2021-05-12 RX ADMIN — HYDROMORPHONE HYDROCHLORIDE 0.25 MILLIGRAM(S): 2 INJECTION INTRAMUSCULAR; INTRAVENOUS; SUBCUTANEOUS at 00:04

## 2021-05-12 RX ADMIN — Medication 15 MILLIGRAM(S): at 22:02

## 2021-05-12 RX ADMIN — HYDROMORPHONE HYDROCHLORIDE 0.5 MILLIGRAM(S): 2 INJECTION INTRAMUSCULAR; INTRAVENOUS; SUBCUTANEOUS at 09:18

## 2021-05-12 RX ADMIN — HYDROMORPHONE HYDROCHLORIDE 0.25 MILLIGRAM(S): 2 INJECTION INTRAMUSCULAR; INTRAVENOUS; SUBCUTANEOUS at 00:20

## 2021-05-12 RX ADMIN — Medication 20 MILLIEQUIVALENT(S): at 09:03

## 2021-05-12 RX ADMIN — Medication 100 GRAM(S): at 09:05

## 2021-05-12 RX ADMIN — Medication 1000 MILLIGRAM(S): at 05:25

## 2021-05-12 RX ADMIN — Medication 15 MILLIGRAM(S): at 05:09

## 2021-05-12 RX ADMIN — Medication 15 MILLIGRAM(S): at 22:32

## 2021-05-12 RX ADMIN — Medication 6 MILLIGRAM(S): at 14:11

## 2021-05-12 RX ADMIN — Medication 15 MILLIGRAM(S): at 16:14

## 2021-05-12 RX ADMIN — Medication 15 MILLIGRAM(S): at 05:25

## 2021-05-12 RX ADMIN — Medication 15 MILLIGRAM(S): at 11:08

## 2021-05-12 RX ADMIN — ONDANSETRON 4 MILLIGRAM(S): 8 TABLET, FILM COATED ORAL at 09:05

## 2021-05-12 RX ADMIN — AMLODIPINE BESYLATE 5 MILLIGRAM(S): 2.5 TABLET ORAL at 05:09

## 2021-05-12 RX ADMIN — Medication 15 MILLIGRAM(S): at 11:14

## 2021-05-12 RX ADMIN — SCOPALAMINE 1 PATCH: 1 PATCH, EXTENDED RELEASE TRANSDERMAL at 18:08

## 2021-05-12 RX ADMIN — ONDANSETRON 4 MILLIGRAM(S): 8 TABLET, FILM COATED ORAL at 00:04

## 2021-05-12 RX ADMIN — HYDROMORPHONE HYDROCHLORIDE 0.5 MILLIGRAM(S): 2 INJECTION INTRAMUSCULAR; INTRAVENOUS; SUBCUTANEOUS at 09:03

## 2021-05-12 RX ADMIN — PANTOPRAZOLE SODIUM 40 MILLIGRAM(S): 20 TABLET, DELAYED RELEASE ORAL at 09:03

## 2021-05-12 RX ADMIN — Medication 400 MILLIGRAM(S): at 05:08

## 2021-05-12 NOTE — DIETITIAN INITIAL EVALUATION ADULT. - OTHER INFO
61 F PMH of HTN, HLD, Lupus, asthma, obesity, PSH of knee arthroscopy and vaginal and anal reconstruction after birth, s/p LSG (5/11)    Pt seen in room, resting in bed. Currently on a BARICLLIQ diet and tolerating PO. Consumed ~2oz total this am. Discussed aiming for -3-4oz/hr as tolerated and importance of hydration PO once IVF is d/c'd and pt is discharged. Pain and nausea controlled; in no apparent distress. Understanding of supplement needs; daughter is picking them up for her. RD provided indepth edu on diet adv. and specific nutrient needs s/p LSG. Pt receptive and expressed understanding. All nutritionally pertinent questions answered. Skin: surgical incision; GI WNL per flowsheet. RD to follow. 61 F PMH of HTN, HLD, Lupus, asthma, obesity, PSH of knee arthroscopy and vaginal and anal reconstruction after birth, s/p LSG (5/11)    Pt seen in room, resting in bed. Currently on a BARICLLIQ diet and tolerating PO. Consumed ~2oz total this am. Discussed aiming for -3-4oz/hr as tolerated and importance of hydration PO once IVF is d/c'd and pt is discharged. Pain and nausea controlled; in no apparent distress. Understanding of supplement needs; daughter is picking them up for her. RD provided indepth edu on diet adv. and specific nutrient needs s/p LSG. Pt receptive and expressed understanding. All nutritionally pertinent questions answered. Allergic to banana, seafood, and pine trees. Skin: surgical incision; GI WNL per flowsheet. RD to follow.

## 2021-05-12 NOTE — DIETITIAN INITIAL EVALUATION ADULT. - OTHER CALCULATIONS
IBW used for calculations as pt >120% of IBW (182%). Above energy needs calculated for wt maintenance (20-25kcal/kg).  Weeks 1-2 estimated needs: 635-762kcal/day (10-12kcal/kg

## 2021-05-12 NOTE — PROGRESS NOTE ADULT - SUBJECTIVE AND OBJECTIVE BOX
INTERVAL HPI/OVERNIGHT EVENTS: pain controlled, encouraged pt to ambulate and use IS, good urine o/p, VSS 5/11: s/p lap sleeve gastrectomy, POC wnl, passed TOV, post op hgb 11.0 (preop 11.3)  STATUS POST:  laparoscopic sleeve gastrectomy    POST OPERATIVE DAY #: 1    SUBJECTIVE:  Patient examined bedside with Dr. Salcido. Patient complaining of nausea and spit up. Patient reports incisional pain is well controlled. Patient reports she is ambulating and using incentive spirometer. Patient denies SOB, chest pain, nausea and emesis. Patient making adequate urine output.      amLODIPine   Tablet 5 milliGRAM(s) Oral daily      Vital Signs Last 24 Hrs  T(C): 36.9 (12 May 2021 05:00), Max: 36.9 (12 May 2021 05:00)  T(F): 98.5 (12 May 2021 05:00), Max: 98.5 (12 May 2021 05:00)  HR: 86 (12 May 2021 05:00) (69 - 86)  BP: 152/77 (12 May 2021 05:00) (138/87 - 176/77)  BP(mean): 101 (11 May 2021 12:02) (97 - 119)  RR: 17 (12 May 2021 05:00) (10 - 20)  SpO2: 95% (12 May 2021 05:00) (95% - 99%)  I&O's Detail    11 May 2021 07:01  -  12 May 2021 07:00  --------------------------------------------------------  IN:    IV PiggyBack: 100 mL    Lactated Ringers: 2400 mL  Total IN: 2500 mL    OUT:    Voided (mL): 2425 mL  Total OUT: 2425 mL    Total NET: 75 mL          General: NAD, resting comfortably in bed  C/V: NSR  Pulm: Nonlabored breathing, no respiratory distress  Abd: soft , non distended , TTP around incision site , incision clean dry and intact  Extrem: WWP, no edema, SCDs in place      LABS:                        10.8   7.91  )-----------( 228      ( 12 May 2021 07:06 )             34.0                  INTERVAL HPI/OVERNIGHT EVENTS: pain controlled, encouraged pt to ambulate and use IS, good urine o/p, VSS 5/11: s/p lap sleeve gastrectomy, POC wnl, passed TOV, post op hgb 11.0 (preop 11.3)  STATUS POST:  laparoscopic sleeve gastrectomy    POST OPERATIVE DAY #: 1    SUBJECTIVE:  Patient examined bedside with Dr. Salcido. Patient complaining of nausea and spit up. Patient reports incisional pain is well controlled. Patient reports she is ambulating and using incentive spirometer. Patient denies SOB and chest pain. Patient making adequate urine output.      amLODIPine   Tablet 5 milliGRAM(s) Oral daily      Vital Signs Last 24 Hrs  T(C): 36.9 (12 May 2021 05:00), Max: 36.9 (12 May 2021 05:00)  T(F): 98.5 (12 May 2021 05:00), Max: 98.5 (12 May 2021 05:00)  HR: 86 (12 May 2021 05:00) (69 - 86)  BP: 152/77 (12 May 2021 05:00) (138/87 - 176/77)  BP(mean): 101 (11 May 2021 12:02) (97 - 119)  RR: 17 (12 May 2021 05:00) (10 - 20)  SpO2: 95% (12 May 2021 05:00) (95% - 99%)  I&O's Detail    11 May 2021 07:01  -  12 May 2021 07:00  --------------------------------------------------------  IN:    IV PiggyBack: 100 mL    Lactated Ringers: 2400 mL  Total IN: 2500 mL    OUT:    Voided (mL): 2425 mL  Total OUT: 2425 mL    Total NET: 75 mL          General: NAD, resting comfortably in bed  C/V: NSR  Pulm: Nonlabored breathing, no respiratory distress  Abd: soft , non distended , TTP around incision site , incision clean dry and intact  Extrem: WWP, no edema, SCDs in place      LABS:                        10.8   7.91  )-----------( 228      ( 12 May 2021 07:06 )             34.0

## 2021-05-13 ENCOUNTER — TRANSCRIPTION ENCOUNTER (OUTPATIENT)
Age: 61
End: 2021-05-13

## 2021-05-13 VITALS
SYSTOLIC BLOOD PRESSURE: 145 MMHG | OXYGEN SATURATION: 97 % | RESPIRATION RATE: 17 BRPM | DIASTOLIC BLOOD PRESSURE: 70 MMHG | TEMPERATURE: 98 F | HEART RATE: 79 BPM

## 2021-05-13 LAB
ANION GAP SERPL CALC-SCNC: 11 MMOL/L — SIGNIFICANT CHANGE UP (ref 5–17)
BUN SERPL-MCNC: 11 MG/DL — SIGNIFICANT CHANGE UP (ref 7–23)
CALCIUM SERPL-MCNC: 9.3 MG/DL — SIGNIFICANT CHANGE UP (ref 8.4–10.5)
CHLORIDE SERPL-SCNC: 102 MMOL/L — SIGNIFICANT CHANGE UP (ref 96–108)
CO2 SERPL-SCNC: 23 MMOL/L — SIGNIFICANT CHANGE UP (ref 22–31)
CREAT SERPL-MCNC: 0.68 MG/DL — SIGNIFICANT CHANGE UP (ref 0.5–1.3)
GLUCOSE SERPL-MCNC: 99 MG/DL — SIGNIFICANT CHANGE UP (ref 70–99)
HCT VFR BLD CALC: 32.7 % — LOW (ref 34.5–45)
HCV RNA SERPL NAA DL=5-ACNC: SIGNIFICANT CHANGE UP IU/ML
HCV RNA SPEC NAA+PROBE-LOG IU: SIGNIFICANT CHANGE UP LOG10IU/ML
HGB BLD-MCNC: 10.5 G/DL — LOW (ref 11.5–15.5)
MAGNESIUM SERPL-MCNC: 2.2 MG/DL — SIGNIFICANT CHANGE UP (ref 1.6–2.6)
MCHC RBC-ENTMCNC: 22.9 PG — LOW (ref 27–34)
MCHC RBC-ENTMCNC: 32.1 GM/DL — SIGNIFICANT CHANGE UP (ref 32–36)
MCV RBC AUTO: 71.4 FL — LOW (ref 80–100)
NRBC # BLD: 0 /100 WBCS — SIGNIFICANT CHANGE UP (ref 0–0)
PHOSPHATE SERPL-MCNC: 3.2 MG/DL — SIGNIFICANT CHANGE UP (ref 2.5–4.5)
PLATELET # BLD AUTO: 213 K/UL — SIGNIFICANT CHANGE UP (ref 150–400)
POTASSIUM SERPL-MCNC: 4 MMOL/L — SIGNIFICANT CHANGE UP (ref 3.5–5.3)
POTASSIUM SERPL-SCNC: 4 MMOL/L — SIGNIFICANT CHANGE UP (ref 3.5–5.3)
RBC # BLD: 4.58 M/UL — SIGNIFICANT CHANGE UP (ref 3.8–5.2)
RBC # FLD: 14 % — SIGNIFICANT CHANGE UP (ref 10.3–14.5)
SODIUM SERPL-SCNC: 136 MMOL/L — SIGNIFICANT CHANGE UP (ref 135–145)
WBC # BLD: 6.76 K/UL — SIGNIFICANT CHANGE UP (ref 3.8–10.5)
WBC # FLD AUTO: 6.76 K/UL — SIGNIFICANT CHANGE UP (ref 3.8–10.5)

## 2021-05-13 PROCEDURE — 84100 ASSAY OF PHOSPHORUS: CPT

## 2021-05-13 PROCEDURE — 86769 SARS-COV-2 COVID-19 ANTIBODY: CPT

## 2021-05-13 PROCEDURE — 85027 COMPLETE CBC AUTOMATED: CPT

## 2021-05-13 PROCEDURE — 83735 ASSAY OF MAGNESIUM: CPT

## 2021-05-13 PROCEDURE — 87522 HEPATITIS C REVRS TRNSCRPJ: CPT

## 2021-05-13 PROCEDURE — 86706 HEP B SURFACE ANTIBODY: CPT

## 2021-05-13 PROCEDURE — 94640 AIRWAY INHALATION TREATMENT: CPT

## 2021-05-13 PROCEDURE — 87340 HEPATITIS B SURFACE AG IA: CPT

## 2021-05-13 PROCEDURE — 80048 BASIC METABOLIC PNL TOTAL CA: CPT

## 2021-05-13 PROCEDURE — 88307 TISSUE EXAM BY PATHOLOGIST: CPT

## 2021-05-13 PROCEDURE — 87389 HIV-1 AG W/HIV-1&-2 AB AG IA: CPT

## 2021-05-13 PROCEDURE — 36415 COLL VENOUS BLD VENIPUNCTURE: CPT

## 2021-05-13 PROCEDURE — C1889: CPT

## 2021-05-13 PROCEDURE — 86803 HEPATITIS C AB TEST: CPT

## 2021-05-13 RX ORDER — APIXABAN 2.5 MG/1
1 TABLET, FILM COATED ORAL
Qty: 60 | Refills: 0
Start: 2021-05-13 | End: 2021-06-11

## 2021-05-13 RX ORDER — OMEPRAZOLE 10 MG/1
1 CAPSULE, DELAYED RELEASE ORAL
Qty: 30 | Refills: 0
Start: 2021-05-13 | End: 2021-06-11

## 2021-05-13 RX ORDER — ACETAMINOPHEN 500 MG
2 TABLET ORAL
Qty: 56 | Refills: 0
Start: 2021-05-13 | End: 2021-05-19

## 2021-05-13 RX ORDER — HYDROMORPHONE HYDROCHLORIDE 2 MG/ML
0.25 INJECTION INTRAMUSCULAR; INTRAVENOUS; SUBCUTANEOUS ONCE
Refills: 0 | Status: DISCONTINUED | OUTPATIENT
Start: 2021-05-13 | End: 2021-05-13

## 2021-05-13 RX ORDER — DICLOFENAC SODIUM 30 MG/G
0 GEL TOPICAL
Qty: 0 | Refills: 0 | DISCHARGE

## 2021-05-13 RX ADMIN — Medication 650 MILLIGRAM(S): at 09:07

## 2021-05-13 RX ADMIN — AMLODIPINE BESYLATE 5 MILLIGRAM(S): 2.5 TABLET ORAL at 05:16

## 2021-05-13 RX ADMIN — PANTOPRAZOLE SODIUM 40 MILLIGRAM(S): 20 TABLET, DELAYED RELEASE ORAL at 11:22

## 2021-05-13 RX ADMIN — Medication 15 MILLIGRAM(S): at 05:30

## 2021-05-13 RX ADMIN — HYDROMORPHONE HYDROCHLORIDE 0.25 MILLIGRAM(S): 2 INJECTION INTRAMUSCULAR; INTRAVENOUS; SUBCUTANEOUS at 04:30

## 2021-05-13 RX ADMIN — Medication 15 MILLIGRAM(S): at 05:16

## 2021-05-13 RX ADMIN — HYDROMORPHONE HYDROCHLORIDE 0.25 MILLIGRAM(S): 2 INJECTION INTRAMUSCULAR; INTRAVENOUS; SUBCUTANEOUS at 04:16

## 2021-05-13 RX ADMIN — Medication 15 MILLIGRAM(S): at 11:22

## 2021-05-13 NOTE — DISCHARGE NOTE PROVIDER - CARE PROVIDER_API CALL
Cam Salcido (MD)  Surgery  [address] [place] [address], [place] [address]  Phone: [phone]  Fax: [phone]  Follow Up Time: 1 week    PCP,   Phone: (   )    -  Fax: (   )    -  Follow Up Time: 1 week

## 2021-05-13 NOTE — DISCHARGE NOTE PROVIDER - NSDCFUADDINST_GEN_ALL_CORE_FT
Follow up with Dr. Salcido in 1 week. Call the office at  to schedule your appointment. You may shower; soap and water over incision sites. Do not scrub. Pat dry when done. No tub bathing or swimming until cleared. Keep incision sites out of the sun as scars will darken. No heavy lifting (>10lbs) or strenuous exercise. Diet: Bariatric Full Fluids. 60 grams protein daily.  64 fluid ounces water daily. Drink small sips throughout the day. Continue diet as outlined by paperwork received as a pre-operative patient. You should be urinating at least 3-4x per day. Call the office if you experience increasing abdominal pain, nausea, vomiting, or temperature >100.4F.  NO ASPIRIN OR NSAIDs until approved by Dr. Salcido. Avoid alcoholic beverages until cleared by Dr. Salcido.  1) Please take Tylenol 650 mg every 4 to 6 hours by mouth for moderate pain control. Please do not exceed over 4,000 mg of Tylenol a day.  2) Please start taking Eliquis 2.5 mg by mouth twice a day starting 3 days after surgery on 5/14/21.  3) Please take Omeprazole 40 mg once a day by mouth.

## 2021-05-13 NOTE — DISCHARGE NOTE PROVIDER - NSDCMRMEDTOKEN_GEN_ALL_CORE_FT
acetaminophen 325 mg oral tablet: 2 tab(s) orally every 6 hours, As needed, Moderate Pain (4 - 6) MDD:4000mg  amLODIPine:   Eliquis 2.5 mg oral tablet: 1 tab(s) orally 2 times a day MDD:2 START ON 5/14/21  omeprazole 40 mg oral delayed release capsule: 1 cap(s) orally once a day MDD:1  vitaminD2: 125 milligram(s) orally once a day  Zetia 10 mg oral tablet: 1 tab(s) orally once a day

## 2021-05-13 NOTE — DISCHARGE NOTE PROVIDER - NSDCCPCAREPLAN_GEN_ALL_CORE_FT
PRINCIPAL DISCHARGE DIAGNOSIS  Diagnosis: Morbid obesity  Assessment and Plan of Treatment: s/p laparoscopic sleeve gastrectomy

## 2021-05-13 NOTE — PROGRESS NOTE ADULT - ASSESSMENT
61 F PMH of HTN, HLD, Lupus, asthma, obesity, PSH of knee arthroscopy and vaginal and anal reconstruction after birth, s/p LSG (5/11)  -Pain/nausea control  -BCLD, IVF  -Protonix  -Eliquis POD3 x30d  -SCDs, OOB/A, IS  -AM labs  -Nutritional consult in AM  Patient examined bedside and case discussed in detail with Dr. Salcido 
61 F PMH of HTN, HLD, Lupus, asthma, obesity, PSH of knee arthroscopy and vaginal and anal reconstruction after birth, s/p LSG (5/11)    -BCLD  -IVF@75  -Zofran/Protonix  -Toradol 15 q6h 12h after OR, Tylenol PRN  -Dietitian consult in AM  -OOB/AMB/SCDs  -CBC6h post-op, no SQH  -AM labs  -encourage PO intake  -poss dc this afternoon

## 2021-05-13 NOTE — DISCHARGE NOTE PROVIDER - PROVIDER TOKENS
PROVIDER:[TOKEN:[[id number]],FOLLOWUP:[1 week]],FREE:[LAST:[PCP],PHONE:[(   )    -],FAX:[(   )    -],FOLLOWUP:[1 week]]

## 2021-05-13 NOTE — PROGRESS NOTE ADULT - SUBJECTIVE AND OBJECTIVE BOX
INTERVAL HPI/OVERNIGHT EVENTS: minimal PO intake    STATUS POST:  laparoscopic sleeve gastrectomy    POST OPERATIVE DAY #: 2    SUBJECTIVE:  pt seen at bedside, complains of pain after drinking. denies nausea/vomiting. +flatus    MEDICATIONS  (STANDING):  amLODIPine   Tablet 5 milliGRAM(s) Oral daily  ketorolac   Injectable 15 milliGRAM(s) IV Push every 6 hours  lactated ringers. 1000 milliLiter(s) (75 mL/Hr) IV Continuous <Continuous>  pantoprazole  Injectable 40 milliGRAM(s) IV Push daily    MEDICATIONS  (PRN):  acetaminophen   Tablet .. 650 milliGRAM(s) Oral every 6 hours PRN Moderate Pain (4 - 6)  ALBUTerol    90 MICROgram(s) HFA Inhaler 2 Puff(s) Inhalation every 6 hours PRN Shortness of Breath and/or Wheezing  ondansetron Injectable 4 milliGRAM(s) IV Push every 6 hours PRN Nausea and/or Vomiting      Vital Signs Last 24 Hrs  T(C): 36.3 (13 May 2021 06:10), Max: 37.2 (12 May 2021 08:34)  T(F): 97.3 (13 May 2021 06:10), Max: 99 (12 May 2021 13:13)  HR: 73 (13 May 2021 06:10) (73 - 94)  BP: 137/74 (13 May 2021 06:10) (131/74 - 172/83)  BP(mean): --  RR: 18 (13 May 2021 06:10) (17 - 18)  SpO2: 96% (13 May 2021 06:10) (96% - 98%)    PHYSICAL EXAM:      Constitutional: A&Ox3    Respiratory: non labored breathing, no respiratory distress    Cardiovascular: NSR, RRR    Gastrointestinal: soft, nontender, nondistended, incisions c/d/i    Extremities: (-) edema                  I&O's Detail    12 May 2021 07:01  -  13 May 2021 07:00  --------------------------------------------------------  IN:    IV PiggyBack: 100 mL    Lactated Ringers: 465 mL    Lactated Ringers: 1125 mL    Oral Fluid: 1200 mL  Total IN: 2890 mL    OUT:    Voided (mL): 2800 mL  Total OUT: 2800 mL    Total NET: 90 mL          LABS:                        10.8   7.91  )-----------( 228      ( 12 May 2021 07:06 )             34.0     05-12    135  |  100  |  8   ----------------------------<  100<H>  3.8   |  25  |  0.70    Ca    9.4      12 May 2021 07:06  Phos  2.8     05-12  Mg     1.9     05-12            RADIOLOGY & ADDITIONAL STUDIES:

## 2021-05-13 NOTE — DISCHARGE NOTE PROVIDER - HOSPITAL COURSE
Patient is a 62 y/o F with PMH of HTN, HLD, Lupus, asthma, obesity, and a PSH of knee arthroscopy and vaginal and anal reconstruction after birth, that presented on day of admission for her scheduled laparoscopic sleeve gastrectomy with Dr. Salcido. Post operatively she was admitted for further management and monitoring. Her postoperative course was unremarkable with advancement of diet, passing trial of void, and pain control. On day of discharge patient was stable to be d/c'd home.

## 2021-05-13 NOTE — DISCHARGE NOTE NURSING/CASE MANAGEMENT/SOCIAL WORK - PATIENT PORTAL LINK FT
You can access the FollowMyHealth Patient Portal offered by [facility] by registering at the following website: [email or web address]. By joining [employer]’s FollowMyHealth portal, you will also be able to view your health information using other applications (apps) compatible with our system.

## 2021-05-14 ENCOUNTER — NON-APPOINTMENT (OUTPATIENT)
Age: 61
End: 2021-05-14

## 2021-05-17 ENCOUNTER — INPATIENT (INPATIENT)
Facility: HOSPITAL | Age: 61
LOS: 1 days | Discharge: HOME | DRG: 392 | End: 2021-05-19
Attending: SURGERY | Admitting: SURGERY
Payer: COMMERCIAL

## 2021-05-17 VITALS
HEART RATE: 78 BPM | HEIGHT: 68 IN | DIASTOLIC BLOOD PRESSURE: 81 MMHG | RESPIRATION RATE: 18 BRPM | TEMPERATURE: 98 F | OXYGEN SATURATION: 99 % | SYSTOLIC BLOOD PRESSURE: 138 MMHG

## 2021-05-17 DIAGNOSIS — Z98.890 OTHER SPECIFIED POSTPROCEDURAL STATES: Chronic | ICD-10-CM

## 2021-05-17 PROBLEM — M35.9 SYSTEMIC INVOLVEMENT OF CONNECTIVE TISSUE, UNSPECIFIED: Chronic | Status: ACTIVE | Noted: 2021-05-10

## 2021-05-17 PROBLEM — D64.9 ANEMIA, UNSPECIFIED: Chronic | Status: ACTIVE | Noted: 2021-05-10

## 2021-05-17 PROBLEM — E66.01 MORBID (SEVERE) OBESITY DUE TO EXCESS CALORIES: Chronic | Status: ACTIVE | Noted: 2021-05-10

## 2021-05-17 PROBLEM — R73.03 PREDIABETES: Chronic | Status: ACTIVE | Noted: 2021-05-11

## 2021-05-17 PROBLEM — J45.909 UNSPECIFIED ASTHMA, UNCOMPLICATED: Chronic | Status: ACTIVE | Noted: 2021-05-10

## 2021-05-17 PROBLEM — I10 ESSENTIAL (PRIMARY) HYPERTENSION: Chronic | Status: ACTIVE | Noted: 2021-05-10

## 2021-05-17 PROBLEM — E78.5 HYPERLIPIDEMIA, UNSPECIFIED: Chronic | Status: ACTIVE | Noted: 2021-05-10

## 2021-05-17 LAB
ALBUMIN SERPL ELPH-MCNC: 3.9 G/DL — SIGNIFICANT CHANGE UP (ref 3.3–5)
ALP SERPL-CCNC: 80 U/L — SIGNIFICANT CHANGE UP (ref 40–120)
ALT FLD-CCNC: 24 U/L — SIGNIFICANT CHANGE UP (ref 10–45)
ANION GAP SERPL CALC-SCNC: 16 MMOL/L — SIGNIFICANT CHANGE UP (ref 5–17)
ANISOCYTOSIS BLD QL: SLIGHT — SIGNIFICANT CHANGE UP
APPEARANCE UR: CLEAR — SIGNIFICANT CHANGE UP
AST SERPL-CCNC: 18 U/L — SIGNIFICANT CHANGE UP (ref 10–40)
BACTERIA # UR AUTO: PRESENT /HPF
BASOPHILS # BLD AUTO: 0.07 K/UL — SIGNIFICANT CHANGE UP (ref 0–0.2)
BASOPHILS NFR BLD AUTO: 0.9 % — SIGNIFICANT CHANGE UP (ref 0–2)
BILIRUB SERPL-MCNC: 0.7 MG/DL — SIGNIFICANT CHANGE UP (ref 0.2–1.2)
BILIRUB UR-MCNC: NEGATIVE — SIGNIFICANT CHANGE UP
BUN SERPL-MCNC: 8 MG/DL — SIGNIFICANT CHANGE UP (ref 7–23)
BURR CELLS BLD QL SMEAR: PRESENT — SIGNIFICANT CHANGE UP
CALCIUM SERPL-MCNC: 9.3 MG/DL — SIGNIFICANT CHANGE UP (ref 8.4–10.5)
CHLORIDE SERPL-SCNC: 102 MMOL/L — SIGNIFICANT CHANGE UP (ref 96–108)
CO2 SERPL-SCNC: 19 MMOL/L — LOW (ref 22–31)
COLOR SPEC: YELLOW — SIGNIFICANT CHANGE UP
COMMENT - URINE: SIGNIFICANT CHANGE UP
CREAT SERPL-MCNC: 0.57 MG/DL — SIGNIFICANT CHANGE UP (ref 0.5–1.3)
DIFF PNL FLD: ABNORMAL
EOSINOPHIL # BLD AUTO: 0.07 K/UL — SIGNIFICANT CHANGE UP (ref 0–0.5)
EOSINOPHIL NFR BLD AUTO: 0.9 % — SIGNIFICANT CHANGE UP (ref 0–6)
EPI CELLS # UR: ABNORMAL /HPF (ref 0–5)
FOLATE SERPL-MCNC: >20 NG/ML — SIGNIFICANT CHANGE UP
GIANT PLATELETS BLD QL SMEAR: PRESENT — SIGNIFICANT CHANGE UP
GLUCOSE SERPL-MCNC: 76 MG/DL — SIGNIFICANT CHANGE UP (ref 70–99)
GLUCOSE UR QL: NEGATIVE — SIGNIFICANT CHANGE UP
HCT VFR BLD CALC: 35.1 % — SIGNIFICANT CHANGE UP (ref 34.5–45)
HGB BLD-MCNC: 11.1 G/DL — LOW (ref 11.5–15.5)
KETONES UR-MCNC: >=80 MG/DL
LEUKOCYTE ESTERASE UR-ACNC: NEGATIVE — SIGNIFICANT CHANGE UP
LIDOCAIN IGE QN: 45 U/L — SIGNIFICANT CHANGE UP (ref 7–60)
LYMPHOCYTES # BLD AUTO: 0.89 K/UL — LOW (ref 1–3.3)
LYMPHOCYTES # BLD AUTO: 11.4 % — LOW (ref 13–44)
MANUAL SMEAR VERIFICATION: SIGNIFICANT CHANGE UP
MCHC RBC-ENTMCNC: 22.5 PG — LOW (ref 27–34)
MCHC RBC-ENTMCNC: 31.6 GM/DL — LOW (ref 32–36)
MCV RBC AUTO: 71.2 FL — LOW (ref 80–100)
MICROCYTES BLD QL: SLIGHT — SIGNIFICANT CHANGE UP
MONOCYTES # BLD AUTO: 0.13 K/UL — SIGNIFICANT CHANGE UP (ref 0–0.9)
MONOCYTES NFR BLD AUTO: 1.7 % — LOW (ref 2–14)
NEUTROPHILS # BLD AUTO: 6.62 K/UL — SIGNIFICANT CHANGE UP (ref 1.8–7.4)
NEUTROPHILS NFR BLD AUTO: 85.1 % — HIGH (ref 43–77)
NITRITE UR-MCNC: NEGATIVE — SIGNIFICANT CHANGE UP
OVALOCYTES BLD QL SMEAR: SLIGHT — SIGNIFICANT CHANGE UP
PH UR: 6 — SIGNIFICANT CHANGE UP (ref 5–8)
PLAT MORPH BLD: ABNORMAL
PLATELET # BLD AUTO: 249 K/UL — SIGNIFICANT CHANGE UP (ref 150–400)
POIKILOCYTOSIS BLD QL AUTO: SLIGHT — SIGNIFICANT CHANGE UP
POTASSIUM SERPL-MCNC: 3.5 MMOL/L — SIGNIFICANT CHANGE UP (ref 3.5–5.3)
POTASSIUM SERPL-SCNC: 3.5 MMOL/L — SIGNIFICANT CHANGE UP (ref 3.5–5.3)
PROT SERPL-MCNC: 7.2 G/DL — SIGNIFICANT CHANGE UP (ref 6–8.3)
PROT UR-MCNC: NEGATIVE MG/DL — SIGNIFICANT CHANGE UP
RBC # BLD: 4.93 M/UL — SIGNIFICANT CHANGE UP (ref 3.8–5.2)
RBC # FLD: 14.4 % — SIGNIFICANT CHANGE UP (ref 10.3–14.5)
RBC BLD AUTO: ABNORMAL
RBC CASTS # UR COMP ASSIST: < 5 /HPF — SIGNIFICANT CHANGE UP
SARS-COV-2 RNA SPEC QL NAA+PROBE: SIGNIFICANT CHANGE UP
SCHISTOCYTES BLD QL AUTO: SLIGHT — SIGNIFICANT CHANGE UP
SODIUM SERPL-SCNC: 137 MMOL/L — SIGNIFICANT CHANGE UP (ref 135–145)
SP GR SPEC: 1.02 — SIGNIFICANT CHANGE UP (ref 1–1.03)
SPHEROCYTES BLD QL SMEAR: SLIGHT — SIGNIFICANT CHANGE UP
SURGICAL PATHOLOGY STUDY: SIGNIFICANT CHANGE UP
UROBILINOGEN FLD QL: 0.2 E.U./DL — SIGNIFICANT CHANGE UP
VIT B12 SERPL-MCNC: 1367 PG/ML — HIGH (ref 232–1245)
WBC # BLD: 7.78 K/UL — SIGNIFICANT CHANGE UP (ref 3.8–10.5)
WBC # FLD AUTO: 7.78 K/UL — SIGNIFICANT CHANGE UP (ref 3.8–10.5)
WBC UR QL: < 5 /HPF — SIGNIFICANT CHANGE UP

## 2021-05-17 PROCEDURE — 99285 EMERGENCY DEPT VISIT HI MDM: CPT

## 2021-05-17 PROCEDURE — 74177 CT ABD & PELVIS W/CONTRAST: CPT | Mod: 26,MD

## 2021-05-17 PROCEDURE — 93010 ELECTROCARDIOGRAM REPORT: CPT

## 2021-05-17 PROCEDURE — 71045 X-RAY EXAM CHEST 1 VIEW: CPT | Mod: 26

## 2021-05-17 RX ORDER — SODIUM CHLORIDE 9 MG/ML
1000 INJECTION, SOLUTION INTRAVENOUS
Refills: 0 | Status: DISCONTINUED | OUTPATIENT
Start: 2021-05-17 | End: 2021-05-19

## 2021-05-17 RX ORDER — SODIUM CHLORIDE 9 MG/ML
1000 INJECTION INTRAMUSCULAR; INTRAVENOUS; SUBCUTANEOUS ONCE
Refills: 0 | Status: COMPLETED | OUTPATIENT
Start: 2021-05-17 | End: 2021-05-17

## 2021-05-17 RX ORDER — ONDANSETRON 8 MG/1
4 TABLET, FILM COATED ORAL EVERY 6 HOURS
Refills: 0 | Status: DISCONTINUED | OUTPATIENT
Start: 2021-05-17 | End: 2021-05-19

## 2021-05-17 RX ORDER — SODIUM CHLORIDE 9 MG/ML
1000 INJECTION, SOLUTION INTRAVENOUS
Refills: 0 | Status: DISCONTINUED | OUTPATIENT
Start: 2021-05-17 | End: 2021-05-17

## 2021-05-17 RX ORDER — ALBUTEROL 90 UG/1
1 AEROSOL, METERED ORAL EVERY 8 HOURS
Refills: 0 | Status: DISCONTINUED | OUTPATIENT
Start: 2021-05-17 | End: 2021-05-19

## 2021-05-17 RX ORDER — PANTOPRAZOLE SODIUM 20 MG/1
40 TABLET, DELAYED RELEASE ORAL
Refills: 0 | Status: DISCONTINUED | OUTPATIENT
Start: 2021-05-17 | End: 2021-05-19

## 2021-05-17 RX ORDER — ACETAMINOPHEN 500 MG
1000 TABLET ORAL ONCE
Refills: 0 | Status: COMPLETED | OUTPATIENT
Start: 2021-05-17 | End: 2021-05-18

## 2021-05-17 RX ORDER — APIXABAN 2.5 MG/1
2.5 TABLET, FILM COATED ORAL
Refills: 0 | Status: DISCONTINUED | OUTPATIENT
Start: 2021-05-17 | End: 2021-05-19

## 2021-05-17 RX ORDER — DEXAMETHASONE 0.5 MG/5ML
6 ELIXIR ORAL ONCE
Refills: 0 | Status: COMPLETED | OUTPATIENT
Start: 2021-05-17 | End: 2021-05-17

## 2021-05-17 RX ORDER — AMLODIPINE BESYLATE 2.5 MG/1
5 TABLET ORAL DAILY
Refills: 0 | Status: DISCONTINUED | OUTPATIENT
Start: 2021-05-17 | End: 2021-05-19

## 2021-05-17 RX ORDER — ACETAMINOPHEN 500 MG
650 TABLET ORAL EVERY 6 HOURS
Refills: 0 | Status: DISCONTINUED | OUTPATIENT
Start: 2021-05-17 | End: 2021-05-19

## 2021-05-17 RX ORDER — IOHEXOL 300 MG/ML
30 INJECTION, SOLUTION INTRAVENOUS ONCE
Refills: 0 | Status: COMPLETED | OUTPATIENT
Start: 2021-05-17 | End: 2021-05-17

## 2021-05-17 RX ORDER — ONDANSETRON 8 MG/1
4 TABLET, FILM COATED ORAL ONCE
Refills: 0 | Status: COMPLETED | OUTPATIENT
Start: 2021-05-17 | End: 2021-05-17

## 2021-05-17 RX ORDER — MORPHINE SULFATE 50 MG/1
4 CAPSULE, EXTENDED RELEASE ORAL ONCE
Refills: 0 | Status: DISCONTINUED | OUTPATIENT
Start: 2021-05-17 | End: 2021-05-17

## 2021-05-17 RX ADMIN — Medication 650 MILLIGRAM(S): at 21:45

## 2021-05-17 RX ADMIN — IOHEXOL 30 MILLILITER(S): 300 INJECTION, SOLUTION INTRAVENOUS at 07:47

## 2021-05-17 RX ADMIN — SODIUM CHLORIDE 1000 MILLILITER(S): 9 INJECTION INTRAMUSCULAR; INTRAVENOUS; SUBCUTANEOUS at 07:48

## 2021-05-17 RX ADMIN — APIXABAN 2.5 MILLIGRAM(S): 2.5 TABLET, FILM COATED ORAL at 18:15

## 2021-05-17 RX ADMIN — MORPHINE SULFATE 4 MILLIGRAM(S): 50 CAPSULE, EXTENDED RELEASE ORAL at 12:02

## 2021-05-17 RX ADMIN — Medication 6 MILLIGRAM(S): at 12:02

## 2021-05-17 RX ADMIN — SODIUM CHLORIDE 150 MILLILITER(S): 9 INJECTION, SOLUTION INTRAVENOUS at 10:48

## 2021-05-17 RX ADMIN — Medication 650 MILLIGRAM(S): at 21:15

## 2021-05-17 RX ADMIN — ONDANSETRON 4 MILLIGRAM(S): 8 TABLET, FILM COATED ORAL at 07:47

## 2021-05-17 RX ADMIN — MORPHINE SULFATE 4 MILLIGRAM(S): 50 CAPSULE, EXTENDED RELEASE ORAL at 07:47

## 2021-05-17 NOTE — H&P ADULT - ASSESSMENT
62 y/o obese F, PMHx HLD. GERD, HTN. Borderline DM, Arthritis, and lupus, s/p gastric sleeve surgery by Dr. Salcido 5/11/21. Presenting to ED with epigastric and lower abdominal pain and PO intolerance. Passing gas and having bowel function. In the ED, VSS. Labs WNL. CTAP c/w normal postop changes.    Plan  - Admit team 2 regional under Dr. Salcido  - Bariatric FLD  - mIVF  - Pain/Nausea control  - Home eliquis 2.5 BID  - Plan discussed with chief and attending.       62 y/o obese F, PMHx HLD. GERD, HTN. Borderline DM, Arthritis, and lupus, s/p gastric sleeve surgery by Dr. Salcido 5/11/21. Presenting to ED with epigastric and lower abdominal pain and PO intolerance. Passing gas and having bowel function. In the ED, VSS. Labs WNL. CTAP c/w normal postop changes. Patient unable to tolerate PO challenge and has dry heaving in the ED.    Plan  - Admit team 2 regional under Dr. Salcido  - Bariatric FLD  - mIVF  - Pain/Nausea control  - Home eliquis 2.5 BID  - Plan discussed with chief and attending.

## 2021-05-17 NOTE — H&P ADULT - NSHPPHYSICALEXAM_GEN_ALL_CORE
Physical Exam:  General: NAD  Pulmonary: Nonlabored breathing, no respiratory distress  Abdominal: soft, TTP in epigastric and lower abdominal pain. Surgical sites C/D/I. Negative for rebound, rigidity, guarding or peritonitis  Extremities: WWP

## 2021-05-17 NOTE — ED ADULT TRIAGE NOTE - ARRIVAL INFO ADDITIONAL COMMENTS
pt had gastric sleeve surgery on 5/11 and states she has had pain ever since.  pt states she has not spoken to her surgeon since her surgery and now it hurts to drink water.  no n/v.

## 2021-05-17 NOTE — ED ADULT TRIAGE NOTE - PATIENT ON (OXYGEN DELIVERY METHOD)
room air 42 male with left kidney stone, non-compliant with treatment recc. Labs, renal stone hunt, pain control, urology.

## 2021-05-17 NOTE — ED ADULT NURSE NOTE - OBJECTIVE STATEMENT
pt a&ox4 resting comfortably in stretcher. pt c/o abd pain s/p gastric sleeve 5/11/21 at [facility]. pt reports that she has not seen/ spoken to her md since sx. reports abd pain with belching and unable to keep food/ fluids down. +n. denies d,v, fevers,cough, weakness, ha, cp, sob.   pt reports she was told to take ibuprofen and tylenol for pain, but has not had any relief from that.

## 2021-05-17 NOTE — ED ADULT NURSE REASSESSMENT NOTE - NS ED NURSE REASSESS COMMENT FT1
pt given x 1 cup of water. drank 1/2 cup of water, actively wretching. no vomiting. + nausea. reports RUQ abdominal pain.. MD cotto made aware.

## 2021-05-17 NOTE — ED ADULT NURSE NOTE - PMH
Anemia  normocytic  Asthma    Connective tissue disease  diffuse  HTN (hypertension)    Hyperlipidemia    Morbid obesity    Prediabetes

## 2021-05-17 NOTE — ED PROVIDER NOTE - GASTROINTESTINAL, MLM
Well healing surgical wounds noted on abdomen; no discharge or erythema. Tenderness to palpation in the epigastrium. Mid abdomen, epigastrium, and left upper area with guarding. Abdomen is otherwise soft.

## 2021-05-17 NOTE — ED PROVIDER NOTE - PMH
Anemia  normocytic  Asthma    Connective tissue disease  diffuse  GERD (gastroesophageal reflux disease)    HTN (hypertension)    Hyperlipidemia    Lupus    Morbid obesity    Prediabetes

## 2021-05-17 NOTE — ED ADULT NURSE REASSESSMENT NOTE - NS ED NURSE REASSESS COMMENT FT1
report received from [facility]  CHELSEA Skinner. Pt resting in stretcher. denies pain at this time. brought to CT scan,  pending results. no distress at this time.

## 2021-05-17 NOTE — H&P ADULT - NSHPLABSRESULTS_GEN_ALL_CORE
EXAM: CT ABDOMEN AND PELVIS OC IC    PROCEDURE DATE: 05/17/2021        INTERPRETATION: CT SCAN OF ABDOMEN AND PELVIS    History: Status post gastric sleeve on 5/11/2021 with abdominal pain.    Technique: CT scan of abdomen and pelvis was performed from lung bases through symphysis pubis. Intravenous and oral contrast material were utilized. Axial, sagittal and coronal reformatted images were reviewed.    Comparison: CTA coronary arteries from 2/20 2/8.    Findings:    Lower chest: No change micronodule right middle lobe.    Liver: A 0.4 cm low-density lesion in the left lobe of the liver is too small to characterize.    Gallbladder: No radiopaque stones gallbladder.    Spleen: Normal.    Pancreas: Normal.    Adrenal glands: Normal.    Kidneys: Normal.    Adenopathy: No lymphadenopathy in abdomen or pelvis.    Ascites: Small pelvic ascites.    Gastrointestinal tract: Interval sleeve gastrectomy. Mild infiltration of the perigastric fat is consistent with postoperative change. No bowel obstruction, abscess, or free air. Small bowel and colon unremarkable.    Vessels: Small calcified plaque. A few calcifications along the left flank are thought to be phleboliths and not ureteral stones, given the lack of hydronephrosis and the normal enhancement of the left kidney.    Pelvic organs: The uterus is slightly heterogeneous and nodular, which could represent small fibroids. No adnexal mass. Bladder unremarkable.    Soft tissues: A a few areas of fat stranding in the upper abdomen and periumbilical region are consistent with postoperative change.    Bones: Normal.    Impression: 1. Since 2/22/2018, there has been sleeve gastrectomy. Mild postoperative changes in the perigastric region and anterior abdominal wall. No cause for abdominal pain identified.    2. Small pelvic ascites.              Thank you for the opportunity to participate in the care of this patient.      ARIES JACKSON MD; Attending Radiologist  This document has been electronically signed. May 17 2021 9:53AM

## 2021-05-17 NOTE — ED PROVIDER NOTE - PSYCHIATRIC, MLM
Alert and oriented. normal mood and affect. no apparent risk to self or others. Alert and oriented. normal mood and affect.

## 2021-05-17 NOTE — ED PROVIDER NOTE - OBJECTIVE STATEMENT
62 y/o obese F, PMHx hld, gerd, htn, borderline dm, arthritis, and lupus, s/p gastric sleeve surgery by Dr. Perez 6 days ago. Pt's procedure was uncomplicated and Pt was sent home with a liquid diet. However since then, Pt has been unable to retain her liquid diet. Pt is supposed to consume 2 protein shakes for a 24 hour period at this time, however she has only been having 1/2 a protein shake as she is unable to tolerate her diet due to severe abdominal pain. The abdominal pain causes her to feel sob. Denies chest pain, nausea and vomiting. Last BM was before surgery, however Pt is passing gas normally. Denies urinary complaints. Denies fevers and chills.  No Hx covid infection, not curently vaccinated for covid. 60 y/o obese F, PMHx hld, gerd, htn, borderline dm, arthritis, and lupus, s/p gastric sleeve surgery by Dr. Salcido 6 days ago p/w abd pain and inability to tolerate po. Pt's procedure was uncomplicated and pt was sent home with a liquid diet. However since then pt has been unable to tolerate her liquid diet. Pt is supposed to consume 2 protein shakes in a 24 hour period at this time, however she has only been having 1/2 a protein shake as she is unable to tolerate her diet due to severe abdominal pain. The abdominal pain causes her to feel sob. Denies chest pain, nausea and vomiting. Last BM was before surgery, however pt is passing gas normally. Denies urinary complaints. Denies fevers and chills.  No Hx covid 19 infection, not curently vaccinated for covid 19.

## 2021-05-17 NOTE — ED PROVIDER NOTE - CLINICAL SUMMARY MEDICAL DECISION MAKING FREE TEXT BOX
ED Course: Vital signs stable. Pt is afebrile and hemodynamically stable.     62 y/o obese F PMHx hld, gerd, htn, borderline dm, arthritis, lupus. Presenting with abdominal pain s/p gastric sleeve surgery done 6 days ago. Plan blood work, CT abdomen and pelvis, pain control, and surgical consult. ED Course: Vital signs stable. Pt is afebrile and hemodynamically stable.     60 y/o obese F PMHx hld, gerd, htn, borderline dm, arthritis, lupus. Presenting with abdominal pain and inability to tolerate po s/p gastric sleeve surgery 6 days ago. Plan blood work, CT abdomen and pelvis, pain control, and surgical consult.    Labs noted. Pt given IVF/ folate/ pain meds and anti-emetics. CT a/p done and with no acute findings. Pt given Decadron per surgery and po trial initiated but pt unable to tolerate po with dry retching and pain. Admitted to surgery. HD stable in ED. ED Course: Vital signs stable. Pt is afebrile and hemodynamically stable.     62 y/o obese F PMHx hld, gerd, htn, borderline dm, arthritis, lupus. Presenting with abdominal pain and inability to tolerate po s/p gastric sleeve surgery 6 days ago. Plan blood work, CT abdomen and pelvis, pain control, and surgical consult.    Labs noted. Pt given IVF/ folic acid/ thiamine/ pain meds and anti-emetics. CT a/p done and with no acute findings. Pt given Decadron per surgery and po trial initiated but pt unable to tolerate po with dry retching and pain. Admitted to surgery. HD stable in ED.

## 2021-05-17 NOTE — H&P ADULT - HISTORY OF PRESENT ILLNESS
60 y/o obese F, PMHx HLD. GERD, HTN. borderline DM, Arthritis, and lupus, s/p gastric sleeve surgery by Dr. Salcido 5/11/21. Pt's procedure was uncomplicated and Pt was sent home with a liquid diet. However since then, Pt has been unable to retain her liquid diet. Pt is supposed to consume 2 protein shakes for a 24 hour period at this time, however she has only been having 1/2 a protein shake as she is unable to tolerate her diet due to severe abdominal pain. The abdominal pain causes her to feel sob. Denies chest pain, nausea and vomiting. Last BM was before surgery, however Pt is passing gas normally. Denies urinary complaints. Denies fevers and chills. In the ED, VSS. Labs WNL. CTAP c/w normal postop changes.

## 2021-05-17 NOTE — ED ADULT NURSE NOTE - NSIMPLEMENTINTERV_GEN_ALL_ED
Implemented All Universal Safety Interventions:  [place] to call system. Call bell, personal items and telephone within reach. Instruct patient to call for assistance. Room bathroom lighting operational. Non-slip footwear when patient is off stretcher. Physically safe environment: no spills, clutter or unnecessary equipment. Stretcher in lowest position, wheels locked, appropriate side rails in place.

## 2021-05-18 LAB
ANION GAP SERPL CALC-SCNC: 16 MMOL/L — SIGNIFICANT CHANGE UP (ref 5–17)
BUN SERPL-MCNC: 8 MG/DL — SIGNIFICANT CHANGE UP (ref 7–23)
CALCIUM SERPL-MCNC: 10 MG/DL — SIGNIFICANT CHANGE UP (ref 8.4–10.5)
CHLORIDE SERPL-SCNC: 101 MMOL/L — SIGNIFICANT CHANGE UP (ref 96–108)
CO2 SERPL-SCNC: 18 MMOL/L — LOW (ref 22–31)
COVID-19 SPIKE DOMAIN AB INTERP: NEGATIVE — SIGNIFICANT CHANGE UP
COVID-19 SPIKE DOMAIN ANTIBODY RESULT: 0.4 U/ML — SIGNIFICANT CHANGE UP
CREAT SERPL-MCNC: 0.54 MG/DL — SIGNIFICANT CHANGE UP (ref 0.5–1.3)
CULTURE RESULTS: SIGNIFICANT CHANGE UP
GLUCOSE SERPL-MCNC: 96 MG/DL — SIGNIFICANT CHANGE UP (ref 70–99)
HCT VFR BLD CALC: 39.7 % — SIGNIFICANT CHANGE UP (ref 34.5–45)
HGB BLD-MCNC: 12.3 G/DL — SIGNIFICANT CHANGE UP (ref 11.5–15.5)
MAGNESIUM SERPL-MCNC: 2 MG/DL — SIGNIFICANT CHANGE UP (ref 1.6–2.6)
MCHC RBC-ENTMCNC: 22.3 PG — LOW (ref 27–34)
MCHC RBC-ENTMCNC: 31 GM/DL — LOW (ref 32–36)
MCV RBC AUTO: 72.1 FL — LOW (ref 80–100)
NRBC # BLD: 0 /100 WBCS — SIGNIFICANT CHANGE UP (ref 0–0)
PHOSPHATE SERPL-MCNC: 2.5 MG/DL — SIGNIFICANT CHANGE UP (ref 2.5–4.5)
PLATELET # BLD AUTO: 322 K/UL — SIGNIFICANT CHANGE UP (ref 150–400)
POTASSIUM SERPL-MCNC: 3.8 MMOL/L — SIGNIFICANT CHANGE UP (ref 3.5–5.3)
POTASSIUM SERPL-SCNC: 3.8 MMOL/L — SIGNIFICANT CHANGE UP (ref 3.5–5.3)
RBC # BLD: 5.51 M/UL — HIGH (ref 3.8–5.2)
RBC # FLD: 14.7 % — HIGH (ref 10.3–14.5)
SARS-COV-2 IGG+IGM SERPL QL IA: 0.4 U/ML — SIGNIFICANT CHANGE UP
SARS-COV-2 IGG+IGM SERPL QL IA: NEGATIVE — SIGNIFICANT CHANGE UP
SODIUM SERPL-SCNC: 135 MMOL/L — SIGNIFICANT CHANGE UP (ref 135–145)
SPECIMEN SOURCE: SIGNIFICANT CHANGE UP
WBC # BLD: 8.79 K/UL — SIGNIFICANT CHANGE UP (ref 3.8–10.5)
WBC # FLD AUTO: 8.79 K/UL — SIGNIFICANT CHANGE UP (ref 3.8–10.5)

## 2021-05-18 RX ORDER — POTASSIUM CHLORIDE 20 MEQ
10 PACKET (EA) ORAL ONCE
Refills: 0 | Status: COMPLETED | OUTPATIENT
Start: 2021-05-18 | End: 2021-05-18

## 2021-05-18 RX ADMIN — Medication 650 MILLIGRAM(S): at 05:02

## 2021-05-18 RX ADMIN — Medication 400 MILLIGRAM(S): at 20:49

## 2021-05-18 RX ADMIN — APIXABAN 2.5 MILLIGRAM(S): 2.5 TABLET, FILM COATED ORAL at 18:31

## 2021-05-18 RX ADMIN — APIXABAN 2.5 MILLIGRAM(S): 2.5 TABLET, FILM COATED ORAL at 06:22

## 2021-05-18 RX ADMIN — Medication 1000 MILLIGRAM(S): at 21:10

## 2021-05-18 RX ADMIN — PANTOPRAZOLE SODIUM 40 MILLIGRAM(S): 20 TABLET, DELAYED RELEASE ORAL at 05:01

## 2021-05-18 RX ADMIN — AMLODIPINE BESYLATE 5 MILLIGRAM(S): 2.5 TABLET ORAL at 05:01

## 2021-05-18 RX ADMIN — Medication 100 MILLIEQUIVALENT(S): at 13:50

## 2021-05-18 RX ADMIN — Medication 650 MILLIGRAM(S): at 05:30

## 2021-05-18 NOTE — DIETITIAN INITIAL EVALUATION ADULT. - NAME AND PHONE
Yes: RD contact info provided to pt for future questions/concerns. Belen Jameson MPH, RD, CDN, CNSC .ext

## 2021-05-18 NOTE — DIETITIAN INITIAL EVALUATION ADULT. - ADD RECOMMEND
1. Continue on current diet order 2. EnsureMax TID (450kcal, 90g pro). 3. Encourage small frequent sips of fluids 4. Please obtain updated standing weight for accuracy. 5. MVI, B12, OsCal daily *d/w team.

## 2021-05-18 NOTE — PROGRESS NOTE ADULT - SUBJECTIVE AND OBJECTIVE BOX
INTERVAL HPI/OVERNIGHT EVENTS: pt states more pain than nausea, encouraged drinking and using IS, pt states she was home in [facility] the whole time, never ambulated, encouraged pt to move her legs and arms in bed, appropriate urine o/p, VSS    SUBJECTIVE:  pt seen at bedside, pain improving. denies nausea/vomiting    MEDICATIONS  (STANDING):  acetaminophen  IVPB .. 1000 milliGRAM(s) IV Intermittent once  amLODIPine   Tablet 5 milliGRAM(s) Oral daily  apixaban 2.5 milliGRAM(s) Oral two times a day  lactated ringers. 1000 milliLiter(s) (130 mL/Hr) IV Continuous <Continuous>  pantoprazole    Tablet 40 milliGRAM(s) Oral before breakfast    MEDICATIONS  (PRN):  acetaminophen   Tablet .. 650 milliGRAM(s) Oral every 6 hours PRN Mild Pain (1 - 3)  ALBUTerol    90 MICROgram(s) HFA Inhaler 1 Puff(s) Inhalation every 8 hours PRN Shortness of Breath and/or Wheezing  ondansetron Injectable 4 milliGRAM(s) IV Push every 6 hours PRN Nausea      Vital Signs Last 24 Hrs  T(C): 36.4 (18 May 2021 04:56), Max: 37.1 (17 May 2021 15:47)  T(F): 97.6 (18 May 2021 04:56), Max: 98.7 (17 May 2021 15:47)  HR: 70 (18 May 2021 04:56) (68 - 78)  BP: 158/81 (18 May 2021 04:56) (136/78 - 159/76)  BP(mean): --  RR: 18 (18 May 2021 04:56) (17 - 18)  SpO2: 99% (18 May 2021 04:56) (97% - 100%)    PHYSICAL EXAM:      Constitutional: A&Ox3    Respiratory: non labored breathing, no respiratory distress    Cardiovascular: NSR, RRR    Gastrointestinal: soft, nontender, nondistended, incisions c/d/i    Extremities: (-) edema                  I&O's Detail    17 May 2021 07:01  -  18 May 2021 07:00  --------------------------------------------------------  IN:    Lactated Ringers: 1690 mL    Oral Fluid: 240 mL    sodium chloride 0.9% w/ Additives: 750 mL  Total IN: 2680 mL    OUT:    Voided (mL): 1200 mL  Total OUT: 1200 mL    Total NET: 1480 mL          LABS:                        11.1   7.78  )-----------( 249      ( 17 May 2021 08:05 )             35.1         137  |  102  |  8   ----------------------------<  76  3.5   |  19<L>  |  0.57    Ca    9.3      17 May 2021 08:05  Mg     1.7         TPro  7.2  /  Alb  3.9  /  TBili  0.7  /  DBili  x   /  AST  18  /  ALT  24  /  AlkPhos  80        Urinalysis Basic - ( 17 May 2021 09:48 )    Color: Yellow / Appearance: Clear / S.025 / pH: x  Gluc: x / Ketone: >=80 mg/dL  / Bili: Negative / Urobili: 0.2 E.U./dL   Blood: x / Protein: NEGATIVE mg/dL / Nitrite: NEGATIVE   Leuk Esterase: NEGATIVE / RBC: < 5 /HPF / WBC < 5 /HPF   Sq Epi: x / Non Sq Epi: 5-10 /HPF / Bacteria: Present /HPF        RADIOLOGY & ADDITIONAL STUDIES:

## 2021-05-18 NOTE — DIETITIAN INITIAL EVALUATION ADULT. - OTHER INFO
60 y/o obese F, PMHx HLD. GERD, HTN. Borderline DM, Arthritis, and lupus, s/p gastric sleeve surgery by Dr. Salcido 5/11/21. Presenting to ED with epigastric and lower abdominal pain and PO intolerance. Passing gas and having bowel function. In the ED, VSS. Labs WNL. CTAP c/w normal postop changes. Unable to tolerate PO challenge in the ED and having dry heaves.    Pt seen in room, resting in bed. Currently on a Phase 1 Bariatric Full Liquid diet and tolerating well so far. Had 2oz of clears this am had yet to begin protein drinks. Since discharge 5/13/21, pt had adv to having 25-50% of 1 protein drink consuming a max of 15g protein/day. Reports passing flatus during this time but no BM since Sx. Had not started taking vitamins yet. Discussed w/ pt where she is in current diet adv. process and inclusion of pudding, smoothies, pureed/strained soups, peanut butter...etc. Written eduication provided along w/ RD contact information. Pt has not weighed herself since surgery, no new weight documented in EMR. Weight at prior admission was 255lbs, bedscale weight today reading 257lbs (diffiuclt to assess accuracy- likely has had some weight loss since discharge given minimal intake). Allergy to bananas, seadood, and pine trees noted in EMR. Encourage small frequent sips throughout day and prioritizing ensure max supplements. Skin: healed surgical incision; GI: constipation, complaining of gas pains. RD to follow.

## 2021-05-18 NOTE — DIETITIAN INITIAL EVALUATION ADULT. - OTHER CALCULATIONS
IBW used for calculations as pt >120% of IBW (182%). Above energy needs calculated for wt maintenance (20-25kcal/kg).   Weeks 1-2 estimated needs: 635-762kcal/day (10-12kcal/kg)

## 2021-05-19 ENCOUNTER — TRANSCRIPTION ENCOUNTER (OUTPATIENT)
Age: 61
End: 2021-05-19

## 2021-05-19 VITALS
OXYGEN SATURATION: 98 % | TEMPERATURE: 97 F | RESPIRATION RATE: 18 BRPM | SYSTOLIC BLOOD PRESSURE: 123 MMHG | HEART RATE: 76 BPM | DIASTOLIC BLOOD PRESSURE: 78 MMHG

## 2021-05-19 LAB
ANION GAP SERPL CALC-SCNC: 11 MMOL/L — SIGNIFICANT CHANGE UP (ref 5–17)
BUN SERPL-MCNC: 11 MG/DL — SIGNIFICANT CHANGE UP (ref 7–23)
CALCIUM SERPL-MCNC: 9.2 MG/DL — SIGNIFICANT CHANGE UP (ref 8.4–10.5)
CHLORIDE SERPL-SCNC: 106 MMOL/L — SIGNIFICANT CHANGE UP (ref 96–108)
CO2 SERPL-SCNC: 21 MMOL/L — LOW (ref 22–31)
CREAT SERPL-MCNC: 0.62 MG/DL — SIGNIFICANT CHANGE UP (ref 0.5–1.3)
GLUCOSE SERPL-MCNC: 88 MG/DL — SIGNIFICANT CHANGE UP (ref 70–99)
HCT VFR BLD CALC: 29.8 % — LOW (ref 34.5–45)
HGB BLD-MCNC: 9.7 G/DL — LOW (ref 11.5–15.5)
MAGNESIUM SERPL-MCNC: 1.6 MG/DL — SIGNIFICANT CHANGE UP (ref 1.6–2.6)
MCHC RBC-ENTMCNC: 22.9 PG — LOW (ref 27–34)
MCHC RBC-ENTMCNC: 32.6 GM/DL — SIGNIFICANT CHANGE UP (ref 32–36)
MCV RBC AUTO: 70.4 FL — LOW (ref 80–100)
NRBC # BLD: 0 /100 WBCS — SIGNIFICANT CHANGE UP (ref 0–0)
PHOSPHATE SERPL-MCNC: 2.7 MG/DL — SIGNIFICANT CHANGE UP (ref 2.5–4.5)
PLATELET # BLD AUTO: 232 K/UL — SIGNIFICANT CHANGE UP (ref 150–400)
POTASSIUM SERPL-MCNC: 3.3 MMOL/L — LOW (ref 3.5–5.3)
POTASSIUM SERPL-SCNC: 3.3 MMOL/L — LOW (ref 3.5–5.3)
RBC # BLD: 4.23 M/UL — SIGNIFICANT CHANGE UP (ref 3.8–5.2)
RBC # FLD: 14.5 % — SIGNIFICANT CHANGE UP (ref 10.3–14.5)
SODIUM SERPL-SCNC: 138 MMOL/L — SIGNIFICANT CHANGE UP (ref 135–145)
WBC # BLD: 6.76 K/UL — SIGNIFICANT CHANGE UP (ref 3.8–10.5)
WBC # FLD AUTO: 6.76 K/UL — SIGNIFICANT CHANGE UP (ref 3.8–10.5)

## 2021-05-19 PROCEDURE — 86769 SARS-COV-2 COVID-19 ANTIBODY: CPT

## 2021-05-19 PROCEDURE — 84100 ASSAY OF PHOSPHORUS: CPT

## 2021-05-19 PROCEDURE — 85025 COMPLETE CBC W/AUTO DIFF WBC: CPT

## 2021-05-19 PROCEDURE — 87086 URINE CULTURE/COLONY COUNT: CPT

## 2021-05-19 PROCEDURE — 74177 CT ABD & PELVIS W/CONTRAST: CPT

## 2021-05-19 PROCEDURE — 99285 EMERGENCY DEPT VISIT HI MDM: CPT | Mod: 25

## 2021-05-19 PROCEDURE — 82746 ASSAY OF FOLIC ACID SERUM: CPT

## 2021-05-19 PROCEDURE — 83735 ASSAY OF MAGNESIUM: CPT

## 2021-05-19 PROCEDURE — 36415 COLL VENOUS BLD VENIPUNCTURE: CPT

## 2021-05-19 PROCEDURE — 81001 URINALYSIS AUTO W/SCOPE: CPT

## 2021-05-19 PROCEDURE — 82607 VITAMIN B-12: CPT

## 2021-05-19 PROCEDURE — 80053 COMPREHEN METABOLIC PANEL: CPT

## 2021-05-19 PROCEDURE — 71045 X-RAY EXAM CHEST 1 VIEW: CPT

## 2021-05-19 PROCEDURE — 85027 COMPLETE CBC AUTOMATED: CPT

## 2021-05-19 PROCEDURE — 83690 ASSAY OF LIPASE: CPT

## 2021-05-19 PROCEDURE — U0003: CPT

## 2021-05-19 PROCEDURE — U0005: CPT

## 2021-05-19 PROCEDURE — 80048 BASIC METABOLIC PNL TOTAL CA: CPT

## 2021-05-19 PROCEDURE — G0378: CPT

## 2021-05-19 RX ORDER — ACETAMINOPHEN 500 MG
1000 TABLET ORAL ONCE
Refills: 0 | Status: COMPLETED | OUTPATIENT
Start: 2021-05-19 | End: 2021-05-19

## 2021-05-19 RX ORDER — POTASSIUM CHLORIDE 20 MEQ
20 PACKET (EA) ORAL ONCE
Refills: 0 | Status: COMPLETED | OUTPATIENT
Start: 2021-05-19 | End: 2021-05-19

## 2021-05-19 RX ORDER — EZETIMIBE 10 MG/1
1 TABLET ORAL
Qty: 0 | Refills: 0 | DISCHARGE

## 2021-05-19 RX ORDER — MAGNESIUM SULFATE 500 MG/ML
2 VIAL (ML) INJECTION
Refills: 0 | Status: COMPLETED | OUTPATIENT
Start: 2021-05-19 | End: 2021-05-19

## 2021-05-19 RX ORDER — KETOROLAC TROMETHAMINE 30 MG/ML
15 SYRINGE (ML) INJECTION ONCE
Refills: 0 | Status: DISCONTINUED | OUTPATIENT
Start: 2021-05-19 | End: 2021-05-19

## 2021-05-19 RX ORDER — POTASSIUM CHLORIDE 20 MEQ
10 PACKET (EA) ORAL
Refills: 0 | Status: DISCONTINUED | OUTPATIENT
Start: 2021-05-19 | End: 2021-05-19

## 2021-05-19 RX ADMIN — Medication 50 GRAM(S): at 10:45

## 2021-05-19 RX ADMIN — APIXABAN 2.5 MILLIGRAM(S): 2.5 TABLET, FILM COATED ORAL at 06:36

## 2021-05-19 RX ADMIN — AMLODIPINE BESYLATE 5 MILLIGRAM(S): 2.5 TABLET ORAL at 06:36

## 2021-05-19 RX ADMIN — Medication 100 MILLIEQUIVALENT(S): at 12:25

## 2021-05-19 RX ADMIN — SODIUM CHLORIDE 130 MILLILITER(S): 9 INJECTION, SOLUTION INTRAVENOUS at 12:26

## 2021-05-19 RX ADMIN — PANTOPRAZOLE SODIUM 40 MILLIGRAM(S): 20 TABLET, DELAYED RELEASE ORAL at 06:36

## 2021-05-19 RX ADMIN — Medication 15 MILLIGRAM(S): at 00:55

## 2021-05-19 RX ADMIN — Medication 15 MILLIGRAM(S): at 01:20

## 2021-05-19 RX ADMIN — Medication 400 MILLIGRAM(S): at 16:35

## 2021-05-19 RX ADMIN — Medication 50 GRAM(S): at 16:57

## 2021-05-19 RX ADMIN — Medication 100 MILLIEQUIVALENT(S): at 14:53

## 2021-05-19 RX ADMIN — Medication 650 MILLIGRAM(S): at 12:30

## 2021-05-19 RX ADMIN — Medication 20 MILLIEQUIVALENT(S): at 15:52

## 2021-05-19 RX ADMIN — APIXABAN 2.5 MILLIGRAM(S): 2.5 TABLET, FILM COATED ORAL at 18:44

## 2021-05-19 NOTE — DISCHARGE NOTE NURSING/CASE MANAGEMENT/SOCIAL WORK - PATIENT PORTAL LINK FT
You can access the FollowMyHealth Patient Portal offered by [facility] by registering at the following website: [email or web address]. By joining [facility]’s FollowMyHealth portal, you will also be able to view your health information using other applications (apps) compatible with our system.

## 2021-05-19 NOTE — PROGRESS NOTE ADULT - REASON FOR ADMISSION
Epigastric and lower abdominal pain. PO intolerance s/p sleeve
Epigastric and lower abdominal pain. PO intolerance s/p sleeve

## 2021-05-19 NOTE — DISCHARGE NOTE PROVIDER - NSDCFUADDINST_GEN_ALL_CORE_FT
Follow up with Dr. Salcido in 1 week. Call the office at  to schedule your appointment. You may shower; soap and water over incision sites. Do not scrub. Pat dry when done. No tub bathing or swimming until cleared. Keep incision sites out of the sun as scars will darken. No heavy lifting (>10lbs) or strenuous exercise. Diet: Bariatric Full Fluids. 60 grams protein daily.  64 fluid ounces water daily. Drink small sips throughout the day. Continue diet as outlined by paperwork received as a pre-operative patient. You should be urinating at least 3-4x per day. Call the office if you experience increasing abdominal pain, nausea, vomiting, or temperature >100.4F.  NO ASPIRIN OR NSAIDs until approved by Dr. Salcido. Avoid alcoholic beverages until cleared by Dr. Salcido.   1) Please take Tylenol 650 mg every 4 to 6 hours by mouth for moderate pain control. Please do not exceed over 4,000 mg of Tylenol a day.  2) Please start taking Eliquis 2.5 mg by mouth twice a day.  3) Please take Omeprazole 40 mg once a day by mouth.

## 2021-05-19 NOTE — DISCHARGE NOTE PROVIDER - NSDCMRMEDTOKEN_GEN_ALL_CORE_FT
acetaminophen 325 mg oral tablet: 2 tab(s) orally every 6 hours, As needed, Moderate Pain (4 - 6) MDD:4000mg  albuterol 0.63 mg/3 mL (0.021%) inhalation solution: 3 milliliter(s) inhaled 3 times a day, As Needed  amLODIPine:   Eliquis 2.5 mg oral tablet: 1 tab(s) orally 2 times a day MDD:2  ezetimibe 10 mg oral tablet: 1 tab(s) orally once a day  omeprazole 40 mg oral delayed release capsule: 1 cap(s) orally once a day MDD:1  vitaminD2: 125 milligram(s) orally once a day

## 2021-05-19 NOTE — DISCHARGE NOTE PROVIDER - CARE PROVIDER_API CALL
Cam Salcido (MD)  Surgery  [address] [place] [address], [place] [address]  Phone: [phone]  Fax: [phone]  Follow Up Time: 1 week

## 2021-05-19 NOTE — PROGRESS NOTE ADULT - ASSESSMENT
62 y/o obese F, PMHx HLD. GERD, HTN. Borderline DM, Arthritis, and lupus, s/p gastric sleeve surgery by Dr. Salcido 5/11/21. Presenting to ED with epigastric and lower abdominal pain and PO intolerance. Passing gas and having bowel function. In the ED, VSS. Labs WNL. CTAP c/w normal postop changes. Unable to tolerate PO challenge in the ED and having dry heaves.    Plan  - Bariatric FLD  - mIVF  - Pain/Nausea control  - Home eliquis 2.5 BID  
60 y/o obese F, PMHx HLD. GERD, HTN. Borderline DM, Arthritis, and lupus, s/p gastric sleeve surgery by Dr. Salcido 5/11/21. Presenting to ED with epigastric and lower abdominal pain and PO intolerance. Passing gas and having bowel function. In the ED, VSS. Labs WNL. CTAP c/w normal postop changes. Unable to tolerate PO challenge in the ED and having dry heaves.    Plan  - Bariatric FLD  - mIVF  - Pain/Nausea control  - Home eliquis 2.5 BID  - encourage PO intake

## 2021-05-19 NOTE — DISCHARGE NOTE PROVIDER - HOSPITAL COURSE
60 y/o obese F, PMHx HLD. GERD, HTN. borderline DM, Arthritis, and lupus, s/p gastric sleeve surgery by Dr. Salcido 5/11/21. Pt's procedure was uncomplicated and Pt was sent home with a liquid diet. However since then, Pt had been unable to retain her liquid diet. Pt was supposed to consume 2 protein shakes for a 24 hour period at this time, however she was only having 1/2 a protein shake as she was unable to tolerate her diet due to severe abdominal pain. The abdominal pain caused her to feel sob. Denied chest pain, nausea and vomiting. Last BM was before surgery, however Pt was passing gas normally. Denied urinary complaints. Denied fevers and chills. In the ED, VSS. Labs WNL. CTAP c/w normal postop changes. She was admitted for further management and monitoring. Her hospital course was unremarkable with advance of diet and toleration. On day of discharge, patient was tolerating liquids and stable for discharge home.

## 2021-05-19 NOTE — DISCHARGE NOTE PROVIDER - NSDCCPCAREPLAN_GEN_ALL_CORE_FT
PRINCIPAL DISCHARGE DIAGNOSIS  Diagnosis: Abdominal pain  Assessment and Plan of Treatment: advanced to liquid diet, tolerating

## 2021-05-19 NOTE — PROGRESS NOTE ADULT - SUBJECTIVE AND OBJECTIVE BOX
INTERVAL HPI/OVERNIGHT EVENTS: CIELO VSS    SUBJECTIVE:  pt seen at bedside, complaining of indigestion and burning at incision sites. tolerating liquids. ambulating    MEDICATIONS  (STANDING):  acetaminophen  IVPB .. 1000 milliGRAM(s) IV Intermittent once  amLODIPine   Tablet 5 milliGRAM(s) Oral daily  apixaban 2.5 milliGRAM(s) Oral two times a day  lactated ringers. 1000 milliLiter(s) (130 mL/Hr) IV Continuous <Continuous>  pantoprazole    Tablet 40 milliGRAM(s) Oral before breakfast    MEDICATIONS  (PRN):  acetaminophen   Tablet .. 650 milliGRAM(s) Oral every 6 hours PRN Mild Pain (1 - 3)  ALBUTerol    90 MICROgram(s) HFA Inhaler 1 Puff(s) Inhalation every 8 hours PRN Shortness of Breath and/or Wheezing  ondansetron Injectable 4 milliGRAM(s) IV Push every 6 hours PRN Nausea      Vital Signs Last 24 Hrs  T(C): 37 (19 May 2021 05:23), Max: 37 (19 May 2021 05:23)  T(F): 98.6 (19 May 2021 05:23), Max: 98.6 (19 May 2021 05:23)  HR: 73 (19 May 2021 05:23) (71 - 78)  BP: 129/72 (19 May 2021 05:23) (118/72 - 136/64)  BP(mean): --  RR: 18 (19 May 2021 05:23) (17 - 18)  SpO2: 98% (19 May 2021 05:23) (97% - 100%)    PHYSICAL EXAM:      Constitutional: A&Ox3    Respiratory: non labored breathing, no respiratory distress    Cardiovascular: NSR, RRR    Gastrointestinal: soft, nondistended, mild incisional tenderness, incisions c/d/i    Extremities: (-) edema                  I&O's Detail    18 May 2021 07:01  -  19 May 2021 07:00  --------------------------------------------------------  IN:    IV PiggyBack: 100 mL    Lactated Ringers: 2990 mL    Oral Fluid: 1040 mL  Total IN: 4130 mL    OUT:    Voided (mL): 1425 mL  Total OUT: 1425 mL    Total NET: 2705 mL          LABS:                        9.7    6.76  )-----------( 232      ( 19 May 2021 06:34 )             29.8     -    138  |  106  |  11  ----------------------------<  88  3.3<L>   |  21<L>  |  0.62    Ca    9.2      19 May 2021 06:34  Phos  2.7       Mg     1.6         TPro  7.2  /  Alb  3.9  /  TBili  0.7  /  DBili  x   /  AST  18  /  ALT  24  /  AlkPhos  80        Urinalysis Basic - ( 17 May 2021 09:48 )    Color: Yellow / Appearance: Clear / S.025 / pH: x  Gluc: x / Ketone: >=80 mg/dL  / Bili: Negative / Urobili: 0.2 E.U./dL   Blood: x / Protein: NEGATIVE mg/dL / Nitrite: NEGATIVE   Leuk Esterase: NEGATIVE / RBC: < 5 /HPF / WBC < 5 /HPF   Sq Epi: x / Non Sq Epi: 5-10 /HPF / Bacteria: Present /HPF        RADIOLOGY & ADDITIONAL STUDIES:

## 2021-05-21 DIAGNOSIS — I10 ESSENTIAL (PRIMARY) HYPERTENSION: ICD-10-CM

## 2021-05-21 DIAGNOSIS — E66.9 OBESITY, UNSPECIFIED: ICD-10-CM

## 2021-05-21 DIAGNOSIS — J45.909 UNSPECIFIED ASTHMA, UNCOMPLICATED: ICD-10-CM

## 2021-05-21 DIAGNOSIS — E66.01 MORBID (SEVERE) OBESITY DUE TO EXCESS CALORIES: ICD-10-CM

## 2021-05-21 DIAGNOSIS — Z91.013 ALLERGY TO SEAFOOD: ICD-10-CM

## 2021-05-21 DIAGNOSIS — L93.2 OTHER LOCAL LUPUS ERYTHEMATOSUS: ICD-10-CM

## 2021-05-21 DIAGNOSIS — Z88.2 ALLERGY STATUS TO SULFONAMIDES: ICD-10-CM

## 2021-05-21 DIAGNOSIS — Z88.0 ALLERGY STATUS TO PENICILLIN: ICD-10-CM

## 2021-05-21 DIAGNOSIS — R73.03 PREDIABETES: ICD-10-CM

## 2021-05-21 DIAGNOSIS — E78.5 HYPERLIPIDEMIA, UNSPECIFIED: ICD-10-CM

## 2021-05-21 DIAGNOSIS — Z91.040 LATEX ALLERGY STATUS: ICD-10-CM

## 2021-05-21 DIAGNOSIS — K76.0 FATTY (CHANGE OF) LIVER, NOT ELSEWHERE CLASSIFIED: ICD-10-CM

## 2021-05-26 ENCOUNTER — APPOINTMENT (OUTPATIENT)
Dept: BARIATRICS | Facility: CLINIC | Age: 61
End: 2021-05-26
Payer: COMMERCIAL

## 2021-05-26 VITALS — HEIGHT: 69 IN | BODY MASS INDEX: 35.16 KG/M2 | WEIGHT: 237.38 LBS

## 2021-05-26 DIAGNOSIS — K21.9 GASTRO-ESOPHAGEAL REFLUX DISEASE WITHOUT ESOPHAGITIS: ICD-10-CM

## 2021-05-26 DIAGNOSIS — M19.90 UNSPECIFIED OSTEOARTHRITIS, UNSPECIFIED SITE: ICD-10-CM

## 2021-05-26 DIAGNOSIS — D64.9 ANEMIA, UNSPECIFIED: ICD-10-CM

## 2021-05-26 DIAGNOSIS — L93.0 DISCOID LUPUS ERYTHEMATOSUS: ICD-10-CM

## 2021-05-26 DIAGNOSIS — R10.9 UNSPECIFIED ABDOMINAL PAIN: ICD-10-CM

## 2021-05-26 DIAGNOSIS — R10.13 EPIGASTRIC PAIN: ICD-10-CM

## 2021-05-26 DIAGNOSIS — Z88.0 ALLERGY STATUS TO PENICILLIN: ICD-10-CM

## 2021-05-26 DIAGNOSIS — Z98.84 BARIATRIC SURGERY STATUS: ICD-10-CM

## 2021-05-26 DIAGNOSIS — J45.909 UNSPECIFIED ASTHMA, UNCOMPLICATED: ICD-10-CM

## 2021-05-26 DIAGNOSIS — E78.5 HYPERLIPIDEMIA, UNSPECIFIED: ICD-10-CM

## 2021-05-26 DIAGNOSIS — Z88.2 ALLERGY STATUS TO SULFONAMIDES: ICD-10-CM

## 2021-05-26 DIAGNOSIS — M35.9 SYSTEMIC INVOLVEMENT OF CONNECTIVE TISSUE, UNSPECIFIED: ICD-10-CM

## 2021-05-26 DIAGNOSIS — R73.03 PREDIABETES: ICD-10-CM

## 2021-05-26 DIAGNOSIS — I10 ESSENTIAL (PRIMARY) HYPERTENSION: ICD-10-CM

## 2021-05-26 DIAGNOSIS — Z91.040 LATEX ALLERGY STATUS: ICD-10-CM

## 2021-05-26 PROCEDURE — 99024 POSTOP FOLLOW-UP VISIT: CPT
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
## 2021-06-04 PROBLEM — M32.9 SYSTEMIC LUPUS ERYTHEMATOSUS, UNSPECIFIED: Chronic | Status: ACTIVE | Noted: 2021-05-18

## 2021-06-04 PROBLEM — K21.9 GASTRO-ESOPHAGEAL REFLUX DISEASE WITHOUT ESOPHAGITIS: Chronic | Status: ACTIVE | Noted: 2021-05-18

## 2021-06-15 NOTE — CONSULT LETTER
[Dear  ___] : Dear  [unfilled], [( Thank you for referring [unfilled] for consultation for _____ )] : Thank you for referring [unfilled] for consultation for [unfilled] [Please see my note below.] : Please see my note below. [Consult Closing:] : Thank you very much for allowing me to participate in the care of this patient.  If you have any questions, please do not hesitate to contact me. [Sincerely,] : Sincerely, [FreeTextEntry2] : Jeanette Guy MD\par [address] \[place] [address] [FreeTextEntry3] : Rowena Henson MD, FCCP\par

## 2021-06-15 NOTE — HISTORY OF PRESENT ILLNESS
[TextBox_4] : 05/03/2021: Asked to evaluate patient by Dr Guy for asthma and pulmonary clearance for sleeve gastrectomy 5/11/21. She is also concerned that she had CTA in 2018 in our ED showing a 5mm nodule vs AVM. She reports lifelong breathing problems but feels she had an asthma attack in her 30s that improved w using her friend's inhaler. Unclear if there is a physician dx of asthma. She uses albuterol prn (mostly pollen season) but did not use before PFT today. Last ED visit for a pulmonary complaint was 2018 and has not taken steroids. Has a number of environmental and medication allergies. Has had ankle surgery, knee surgery, gyn fistula repair, no pulmonary complications. No personal hx of VTE. Never smoker. Water leaking in her home and mold, neighbors smoke MJ around her home. Did not have Covid, not vaccinated. Had flu like side effects to flu vaccine but no allergic reaction. Has stated hx SLE on HCQ, used to see Dr Bhardwaj, now Dr Fernandez. Snores loud enough to wake upstairs neighbors, witnessed apneas, nocturnal awakenings, daytime sleepiness.\par
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
## 2021-06-16 ENCOUNTER — APPOINTMENT (OUTPATIENT)
Dept: PULMONOLOGY | Facility: CLINIC | Age: 61
End: 2021-06-16

## 2021-06-24 ENCOUNTER — EMERGENCY (EMERGENCY)
Facility: HOSPITAL | Age: 61
LOS: 1 days | Discharge: HOME | End: 2021-06-24
Attending: EMERGENCY MEDICINE | Admitting: EMERGENCY MEDICINE
Payer: COMMERCIAL

## 2021-06-24 VITALS
DIASTOLIC BLOOD PRESSURE: 72 MMHG | SYSTOLIC BLOOD PRESSURE: 144 MMHG | OXYGEN SATURATION: 97 % | RESPIRATION RATE: 18 BRPM | WEIGHT: 220.9 LBS | HEART RATE: 86 BPM | HEIGHT: 68 IN | TEMPERATURE: 98 F

## 2021-06-24 VITALS
RESPIRATION RATE: 16 BRPM | DIASTOLIC BLOOD PRESSURE: 70 MMHG | TEMPERATURE: 98 F | HEART RATE: 67 BPM | OXYGEN SATURATION: 98 % | SYSTOLIC BLOOD PRESSURE: 131 MMHG

## 2021-06-24 DIAGNOSIS — R10.13 EPIGASTRIC PAIN: ICD-10-CM

## 2021-06-24 DIAGNOSIS — Z91.013 ALLERGY TO SEAFOOD: ICD-10-CM

## 2021-06-24 DIAGNOSIS — Z88.1 ALLERGY STATUS TO OTHER ANTIBIOTIC AGENTS STATUS: ICD-10-CM

## 2021-06-24 DIAGNOSIS — M32.9 SYSTEMIC LUPUS ERYTHEMATOSUS, UNSPECIFIED: ICD-10-CM

## 2021-06-24 DIAGNOSIS — Z88.0 ALLERGY STATUS TO PENICILLIN: ICD-10-CM

## 2021-06-24 DIAGNOSIS — Z98.890 OTHER SPECIFIED POSTPROCEDURAL STATES: Chronic | ICD-10-CM

## 2021-06-24 DIAGNOSIS — Z98.84 BARIATRIC SURGERY STATUS: ICD-10-CM

## 2021-06-24 DIAGNOSIS — Z20.822 CONTACT WITH AND (SUSPECTED) EXPOSURE TO COVID-19: ICD-10-CM

## 2021-06-24 DIAGNOSIS — E66.01 MORBID (SEVERE) OBESITY DUE TO EXCESS CALORIES: ICD-10-CM

## 2021-06-24 DIAGNOSIS — R73.03 PREDIABETES: ICD-10-CM

## 2021-06-24 DIAGNOSIS — E78.5 HYPERLIPIDEMIA, UNSPECIFIED: ICD-10-CM

## 2021-06-24 DIAGNOSIS — I10 ESSENTIAL (PRIMARY) HYPERTENSION: ICD-10-CM

## 2021-06-24 DIAGNOSIS — K21.9 GASTRO-ESOPHAGEAL REFLUX DISEASE WITHOUT ESOPHAGITIS: ICD-10-CM

## 2021-06-24 DIAGNOSIS — E87.6 HYPOKALEMIA: ICD-10-CM

## 2021-06-24 DIAGNOSIS — Z91.030 BEE ALLERGY STATUS: ICD-10-CM

## 2021-06-24 DIAGNOSIS — M19.90 UNSPECIFIED OSTEOARTHRITIS, UNSPECIFIED SITE: ICD-10-CM

## 2021-06-24 DIAGNOSIS — Z91.040 LATEX ALLERGY STATUS: ICD-10-CM

## 2021-06-24 DIAGNOSIS — Z88.2 ALLERGY STATUS TO SULFONAMIDES: ICD-10-CM

## 2021-06-24 DIAGNOSIS — Z91.018 ALLERGY TO OTHER FOODS: ICD-10-CM

## 2021-06-24 LAB
ALBUMIN SERPL ELPH-MCNC: 4.1 G/DL — SIGNIFICANT CHANGE UP (ref 3.3–5)
ALP SERPL-CCNC: 77 U/L — SIGNIFICANT CHANGE UP (ref 40–120)
ALT FLD-CCNC: 47 U/L — HIGH (ref 10–45)
ANION GAP SERPL CALC-SCNC: 20 MMOL/L — HIGH (ref 5–17)
ANISOCYTOSIS BLD QL: SLIGHT — SIGNIFICANT CHANGE UP
AST SERPL-CCNC: 38 U/L — SIGNIFICANT CHANGE UP (ref 10–40)
BASOPHILS # BLD AUTO: 0 K/UL — SIGNIFICANT CHANGE UP (ref 0–0.2)
BASOPHILS NFR BLD AUTO: 0 % — SIGNIFICANT CHANGE UP (ref 0–2)
BILIRUB SERPL-MCNC: 0.8 MG/DL — SIGNIFICANT CHANGE UP (ref 0.2–1.2)
BUN SERPL-MCNC: 10 MG/DL — SIGNIFICANT CHANGE UP (ref 7–23)
BURR CELLS BLD QL SMEAR: PRESENT — SIGNIFICANT CHANGE UP
CALCIUM SERPL-MCNC: 10 MG/DL — SIGNIFICANT CHANGE UP (ref 8.4–10.5)
CHLORIDE SERPL-SCNC: 104 MMOL/L — SIGNIFICANT CHANGE UP (ref 96–108)
CO2 SERPL-SCNC: 22 MMOL/L — SIGNIFICANT CHANGE UP (ref 22–31)
CREAT SERPL-MCNC: 0.72 MG/DL — SIGNIFICANT CHANGE UP (ref 0.5–1.3)
DACRYOCYTES BLD QL SMEAR: SLIGHT — SIGNIFICANT CHANGE UP
EOSINOPHIL # BLD AUTO: 0.06 K/UL — SIGNIFICANT CHANGE UP (ref 0–0.5)
EOSINOPHIL NFR BLD AUTO: 0.9 % — SIGNIFICANT CHANGE UP (ref 0–6)
GIANT PLATELETS BLD QL SMEAR: PRESENT — SIGNIFICANT CHANGE UP
GLUCOSE SERPL-MCNC: 101 MG/DL — HIGH (ref 70–99)
HCT VFR BLD CALC: 42.2 % — SIGNIFICANT CHANGE UP (ref 34.5–45)
HGB BLD-MCNC: 13.2 G/DL — SIGNIFICANT CHANGE UP (ref 11.5–15.5)
HYPOCHROMIA BLD QL: SLIGHT — SIGNIFICANT CHANGE UP
LACTATE SERPL-SCNC: 1.6 MMOL/L — SIGNIFICANT CHANGE UP (ref 0.5–2)
LIDOCAIN IGE QN: 24 U/L — SIGNIFICANT CHANGE UP (ref 7–60)
LYMPHOCYTES # BLD AUTO: 1.69 K/UL — SIGNIFICANT CHANGE UP (ref 1–3.3)
LYMPHOCYTES # BLD AUTO: 25.2 % — SIGNIFICANT CHANGE UP (ref 13–44)
MANUAL SMEAR VERIFICATION: SIGNIFICANT CHANGE UP
MCHC RBC-ENTMCNC: 22.6 PG — LOW (ref 27–34)
MCHC RBC-ENTMCNC: 31.3 GM/DL — LOW (ref 32–36)
MCV RBC AUTO: 72.4 FL — LOW (ref 80–100)
MICROCYTES BLD QL: SLIGHT — SIGNIFICANT CHANGE UP
MONOCYTES # BLD AUTO: 0.65 K/UL — SIGNIFICANT CHANGE UP (ref 0–0.9)
MONOCYTES NFR BLD AUTO: 9.6 % — SIGNIFICANT CHANGE UP (ref 2–14)
NEUTROPHILS # BLD AUTO: 4.32 K/UL — SIGNIFICANT CHANGE UP (ref 1.8–7.4)
NEUTROPHILS NFR BLD AUTO: 64.3 % — SIGNIFICANT CHANGE UP (ref 43–77)
OVALOCYTES BLD QL SMEAR: SLIGHT — SIGNIFICANT CHANGE UP
PLAT MORPH BLD: ABNORMAL
PLATELET # BLD AUTO: 283 K/UL — SIGNIFICANT CHANGE UP (ref 150–400)
POIKILOCYTOSIS BLD QL AUTO: SIGNIFICANT CHANGE UP
POLYCHROMASIA BLD QL SMEAR: SLIGHT — SIGNIFICANT CHANGE UP
POTASSIUM SERPL-MCNC: 2.9 MMOL/L — CRITICAL LOW (ref 3.5–5.3)
POTASSIUM SERPL-SCNC: 2.9 MMOL/L — CRITICAL LOW (ref 3.5–5.3)
PROT SERPL-MCNC: 7.7 G/DL — SIGNIFICANT CHANGE UP (ref 6–8.3)
RBC # BLD: 5.83 M/UL — HIGH (ref 3.8–5.2)
RBC # FLD: 16.7 % — HIGH (ref 10.3–14.5)
RBC BLD AUTO: ABNORMAL
SARS-COV-2 RNA SPEC QL NAA+PROBE: NEGATIVE — SIGNIFICANT CHANGE UP
SCHISTOCYTES BLD QL AUTO: SIGNIFICANT CHANGE UP
SODIUM SERPL-SCNC: 146 MMOL/L — HIGH (ref 135–145)
TARGETS BLD QL SMEAR: SLIGHT — SIGNIFICANT CHANGE UP
WBC # BLD: 6.72 K/UL — SIGNIFICANT CHANGE UP (ref 3.8–10.5)
WBC # FLD AUTO: 6.72 K/UL — SIGNIFICANT CHANGE UP (ref 3.8–10.5)

## 2021-06-24 PROCEDURE — 80053 COMPREHEN METABOLIC PANEL: CPT

## 2021-06-24 PROCEDURE — 74177 CT ABD & PELVIS W/CONTRAST: CPT

## 2021-06-24 PROCEDURE — 74177 CT ABD & PELVIS W/CONTRAST: CPT | Mod: 26,MG

## 2021-06-24 PROCEDURE — 99284 EMERGENCY DEPT VISIT MOD MDM: CPT | Mod: 25

## 2021-06-24 PROCEDURE — 87635 SARS-COV-2 COVID-19 AMP PRB: CPT

## 2021-06-24 PROCEDURE — 96375 TX/PRO/DX INJ NEW DRUG ADDON: CPT

## 2021-06-24 PROCEDURE — 99285 EMERGENCY DEPT VISIT HI MDM: CPT | Mod: 25

## 2021-06-24 PROCEDURE — 93005 ELECTROCARDIOGRAM TRACING: CPT

## 2021-06-24 PROCEDURE — 93010 ELECTROCARDIOGRAM REPORT: CPT

## 2021-06-24 PROCEDURE — 36415 COLL VENOUS BLD VENIPUNCTURE: CPT

## 2021-06-24 PROCEDURE — 85025 COMPLETE CBC W/AUTO DIFF WBC: CPT

## 2021-06-24 PROCEDURE — 83690 ASSAY OF LIPASE: CPT

## 2021-06-24 PROCEDURE — 96374 THER/PROPH/DIAG INJ IV PUSH: CPT | Mod: XU

## 2021-06-24 PROCEDURE — 83605 ASSAY OF LACTIC ACID: CPT

## 2021-06-24 PROCEDURE — G1004: CPT

## 2021-06-24 PROCEDURE — 96361 HYDRATE IV INFUSION ADD-ON: CPT

## 2021-06-24 RX ORDER — SODIUM CHLORIDE 9 MG/ML
1000 INJECTION INTRAMUSCULAR; INTRAVENOUS; SUBCUTANEOUS ONCE
Refills: 0 | Status: COMPLETED | OUTPATIENT
Start: 2021-06-24 | End: 2021-06-24

## 2021-06-24 RX ORDER — THIAMINE MONONITRATE (VIT B1) 100 MG
100 TABLET ORAL ONCE
Refills: 0 | Status: COMPLETED | OUTPATIENT
Start: 2021-06-24 | End: 2021-06-24

## 2021-06-24 RX ORDER — ONDANSETRON 8 MG/1
4 TABLET, FILM COATED ORAL ONCE
Refills: 0 | Status: COMPLETED | OUTPATIENT
Start: 2021-06-24 | End: 2021-06-24

## 2021-06-24 RX ORDER — POTASSIUM CHLORIDE 20 MEQ
40 PACKET (EA) ORAL ONCE
Refills: 0 | Status: COMPLETED | OUTPATIENT
Start: 2021-06-24 | End: 2021-06-24

## 2021-06-24 RX ORDER — IOHEXOL 300 MG/ML
30 INJECTION, SOLUTION INTRAVENOUS ONCE
Refills: 0 | Status: COMPLETED | OUTPATIENT
Start: 2021-06-24 | End: 2021-06-24

## 2021-06-24 RX ORDER — HYOSCYAMINE SULFATE 0.13 MG
0.12 TABLET ORAL ONCE
Refills: 0 | Status: COMPLETED | OUTPATIENT
Start: 2021-06-24 | End: 2021-06-24

## 2021-06-24 RX ORDER — SUCRALFATE 1 G
1 TABLET ORAL ONCE
Refills: 0 | Status: COMPLETED | OUTPATIENT
Start: 2021-06-24 | End: 2021-06-24

## 2021-06-24 RX ORDER — POTASSIUM CHLORIDE 20 MEQ
10 PACKET (EA) ORAL
Refills: 0 | Status: DISCONTINUED | OUTPATIENT
Start: 2021-06-24 | End: 2021-06-28

## 2021-06-24 RX ORDER — HYOSCYAMINE SULFATE 0.13 MG
1 TABLET ORAL
Qty: 56 | Refills: 0
Start: 2021-06-24 | End: 2021-07-07

## 2021-06-24 RX ORDER — FOLIC ACID 0.8 MG
1 TABLET ORAL ONCE
Refills: 0 | Status: COMPLETED | OUTPATIENT
Start: 2021-06-24 | End: 2021-06-24

## 2021-06-24 RX ADMIN — Medication 100 MILLIEQUIVALENT(S): at 17:11

## 2021-06-24 RX ADMIN — Medication 100 MILLIGRAM(S): at 15:43

## 2021-06-24 RX ADMIN — Medication 0.12 MILLIGRAM(S): at 15:46

## 2021-06-24 RX ADMIN — Medication 100 MILLIEQUIVALENT(S): at 15:21

## 2021-06-24 RX ADMIN — Medication 40 MILLIEQUIVALENT(S): at 15:21

## 2021-06-24 RX ADMIN — SODIUM CHLORIDE 1000 MILLILITER(S): 9 INJECTION INTRAMUSCULAR; INTRAVENOUS; SUBCUTANEOUS at 15:43

## 2021-06-24 RX ADMIN — Medication 10 MILLIEQUIVALENT(S): at 17:01

## 2021-06-24 RX ADMIN — SODIUM CHLORIDE 1000 MILLILITER(S): 9 INJECTION INTRAMUSCULAR; INTRAVENOUS; SUBCUTANEOUS at 13:38

## 2021-06-24 RX ADMIN — Medication 1 MILLIGRAM(S): at 15:23

## 2021-06-24 RX ADMIN — IOHEXOL 30 MILLILITER(S): 300 INJECTION, SOLUTION INTRAVENOUS at 15:21

## 2021-06-24 RX ADMIN — Medication 1 GRAM(S): at 13:40

## 2021-06-24 RX ADMIN — SODIUM CHLORIDE 1000 MILLILITER(S): 9 INJECTION INTRAMUSCULAR; INTRAVENOUS; SUBCUTANEOUS at 19:02

## 2021-06-24 RX ADMIN — SODIUM CHLORIDE 1000 MILLILITER(S): 9 INJECTION INTRAMUSCULAR; INTRAVENOUS; SUBCUTANEOUS at 15:06

## 2021-06-24 RX ADMIN — ONDANSETRON 4 MILLIGRAM(S): 8 TABLET, FILM COATED ORAL at 19:01

## 2021-06-24 NOTE — ED PROVIDER NOTE - DISCHARGE DATE
"Abdominal pain x 3 days. Worsening today. Located in lower RLQ, LLQ, LUQ. Describes as cramping in lower. Currently 6/10. Took Tylenol last night. Helped "a lil bit." Pain constant. Gets better at times. Pt referred to urgent car or ER. Agrees to go to urgent care    Reason for Disposition   [1] MILD-MODERATE pain AND [2] constant AND [3] present > 2 hours    Protocols used: ABDOMINAL PAIN - FEMALE-A-AH      "
24-Jun-2021

## 2021-06-24 NOTE — ED PROVIDER NOTE - PROGRESS NOTE DETAILS
[place]: K 2.9 (no signifcant EKG changes), Na 146, other labs grossly wnl. rediscussed w/ surgery, requesting thiamine/folate/levsin and CT. Pt remains well appearing. Updated pt.

## 2021-06-24 NOTE — CONSULT NOTE ADULT - SUBJECTIVE AND OBJECTIVE BOX
HPI:  62 y/o obese F, PMHx HLD, GERD, HTN, borderline DM, Arthritis, and lupus, s/p gastric sleeve surgery by Dr. Salcido 5/11/21. Pt's procedure was uncomplicated and she was sent home with a liquid diet. Readmitted 5/17/21 for PO intolerance associated with abdominal pain. CTAP at the time was c/w normal postop changes. She was admitted for further management and monitoring. Her hospital course was unremarkable with advancement of diet and toleration. On day of discharge, patient was tolerating liquids and stable for discharge home.    Patient represented today for persistent epigastric pain and decreased PO intake associated with regurgitation of food and nausea. Deneis emesis. Also notes, constipation since discharge which is relieved with laxatives and had x2 NB BMs in the last few days. Denies, CP, SOB, changes in urinary habits.     In ED patient recieved CT scan with wet read grossly neg. Treated with folic acid, thiamine, levsin, and zofran prn. Upon repeat evaluation, the patient felt improved clinically and PO challenge was initiatied.     PAST MEDICAL & SURGICAL HISTORY:  Morbid obesity  HTN (hypertension)  Hyperlipidemia  Anemia, normocytic  Connective tissue disease diffuse  Prediabetes  Lupus  GERD (gastroesophageal reflux disease)  History of ankle surgery  History of knee surgery    REVIEW OF SYSTEMS  ROS negative except as noted in HPI    MEDICATIONS  (STANDING):  MED REC PENDING!     Allergies  Bananas (Rash)  bee sting loss of consciousness,skin burn (Other)  latex (Hives; Pruritus)  levofloxacin (Hives)  penicillins (Short breath)  pine tree (Hives; Rash)  quinolines (Short breath)  Seafood (Short breath)  sulfa drugs (Short breath)      SOCIAL HISTORY:  Lives with 16yo grandson in [place]   No ETOH use, nonsmoker, no illicit drug use     Vital Signs Last 24 Hrs  T(C): 36.9 (24 Jun 2021 13:00), Max: 36.9 (24 Jun 2021 13:00)  T(F): 98.4 (24 Jun 2021 13:00), Max: 98.4 (24 Jun 2021 13:00)  HR: 86 (24 Jun 2021 13:00) (86 - 86)  BP: 144/72 (24 Jun 2021 13:00) (144/72 - 144/72)  RR: 18 (24 Jun 2021 13:00) (18 - 18)  SpO2: 97% (24 Jun 2021 13:00) (97% - 97%)    PHYSICAL EXAM:  GEN: NAD, resting comfortably in stretcher  CV: NSR, pulses present in b/l upper ext.   Pulm: no respiratory distress on RA   Abd: soft, ND, TTP epigastric region, no rebound or guarding, not distractable, well-healed surgical scars   Ext: WWP, no edema    LABS:                        13.2   6.72  )-----------( 283      ( 24 Jun 2021 14:00 )             42.2     06-24    146<H>  |  104  |  10  ----------------------------<  101<H>  2.9<LL>   |  22  |  0.72    Ca    10.0      24 Jun 2021 14:00    TPro  7.7  /  Alb  4.1  /  TBili  0.8  /  DBili  x   /  AST  38  /  ALT  47<H>  /  AlkPhos  77  06-24      RADIOLOGY & ADDITIONAL STUDIES:    Impression:  Since 5/17/2021, the patient is again post sleeve gastrectomy without evidence of complication. Interval resolution of small volume pelvic ascites.  No acute abdominopelvic abnormality.

## 2021-06-24 NOTE — CONSULT NOTE ADULT - ASSESSMENT
60 y/o obese F, PMHx HLD, GERD, HTN, borderline DM, Arthritis, and lupus, s/p gastric sleeve surgery by Dr. Salcido 5/11/21, CT scan unremarkable and underwent PO challenge in ER without issue    Plan:  pt seen by chief resident  [facility] on hyocyamine q6hrs  follow up outpatient  discussed nutrition options per bariatric protocol  discussed with Dr. Salcido

## 2021-06-24 NOTE — ED PROVIDER NOTE - IV ALTEPLASE ADMIN OUTSIDE HIDDEN
Outpatient Physical Therapy Peds Treatment Note [facility]     Patient Name: Sinan Julian  : 2004  MRN: [id number]  Today's Date: 2018       Visit Date: 2018    There is no problem list on file for this patient.    No past medical history on file.  No past surgical history on file.    Visit Dx:    ICD-10-CM ICD-9-CM   1. PDD (pervasive developmental disorder) F84.9 299.90                     PT Assessment/Plan     Row Name 18 [id number]          PT Assessment    Assessment Comments Child participated well with therapist for treatment this date. Child requires frequent redirection to tasks secondary to distraction by external stimuli, especially with ball skills activity. Child requires frequent rest breaks secondary to decreased endurance limiting performance throughout session. Child with increased difficulty hopping on one leg secondary to balance and strength deficits. Child requires use of support surface in order to maintain balance and hop 5 times. Child with difficulty with scooterboard activity, child able to complete while seated in rolling chair for 4 laps with rest break between each lap. Child remains appropriate for OP PT services.  -        PT Plan    PT Frequency 1x/week  -     PT Plan Comments Continue plan of care with focus on core strength  -       User Key  (r) = Recorded By, (t) = Taken By, (c) = Cosigned By    Initials Name Provider Type    NEGAR Rainey, PT Physical Therapist                    Exercises     Row Name 18 [id number]             Subjective Comments    Subjective Comments Child arrived for appointment with mother and 2 younger brothers who remained in [facility] for duration of PT session.  Mother reports father will drop off bicycle later this week.  Child reports her helmet showed arrived tomorrow.  Mother reports no changes and no concerns.  Mother and child reports they have been working on home program over the weekend.  -          Subjective Pain    Able to rate subjective pain? no  -DH      Subjective Pain Comment No signs or symptoms before, during, or after treatment.  -DH         Exercise 1    Exercise Name 1 UE/LE recumbent bike for strengthening and coronation  -DH      Cueing 1 Verbal  -DH      Time 1 10 minutes  -DH      Additional Comments Level 4.0.  Multiple rest breaks throughout  -DH         Exercise 2    Exercise Name 2 Hopping on 1 leg  -DH      Cueing 2 Verbal;Tactile  -DH      Time 2 5 minutes  -DH      Additional Comments Child with increased difficulty secondary to poor balance.  Attempted on trampoline with no success.  Child completes at support surface and will hop approximately 5 times prior to setting other foot down  -DH         Exercise 3    Exercise Name 3 Scooter activity for lower extremity strengthening  -DH      Cueing 3 Verbal;Tactile;Demo  -DH      Time 3 10 minutes  -DH      Additional Comments Seated in rolling chair.  4 laps, rest break after each  -DH         Exercise 4    Exercise Name 4 Sit-ups  -DH      Cueing 4 Verbal;Tactile  -DH      Sets 4 2  -DH      Reps 4 10  -DH      Additional Comments Knees arms in front of body. Child completes with rest breaks throughout  -DH         Exercise 5    Exercise Name 5 Forearm plank  -DH      Cueing 5 Verbal;Tactile;Demo  -DH      Reps 5 3  -DH      Additional Comments On knees.  Holds for 10 seconds.  Verbal cues to look forward  -DH         Exercise 6    Exercise Name 6 Clamshells for hip abductor strengthening  -DH      Cueing 6 Tactile;Verbal;Demo  -DH      Sets 6 2  -DH      Reps 6 10  -DH      Additional Comments Min tactile cues for posture  -DH         Exercise 7    Exercise Name 7 Bridges for lower extremity strengthening  -DH      Cueing 7 Verbal;Tactile;Demo  -DH      Sets 7 2  -DH      Reps 7 10  -DH      Additional Comments Cues to complete slowly  -DH         Exercise 8    Exercise Name 8 Throwing ball at target 10 feet away  -DH      Cueing 8  Verbal;Tactile  -      Reps 8 20  -      Additional Comments 25% success  -         Exercise 9    Exercise Name 9 Dribbling ball alternating  -      Cueing 9 Verbal;Tactile;Demo  -      Time 9 5 minutes  -      Additional Comments Initially with super high bounced ball and progress to tennis ball  -         Exercise 10    Exercise Name 10 Seated on platform swing for vestibular input  -      Cueing 10 Verbal  -      Time 10 5 minutes  -      Additional Comments For motor development  -         Exercise 11    Exercise Name 11 Catch from 10 feet away  -      Cueing 11 Verbal;Tactile  -      Additional Comments Initially with 4 inch ball, progress to tennis ball  -        User Key  (r) = Recorded By, (t) = Taken By, (c) = Cosigned By    Initials Name Provider Type    NEGAR Rainey, PT Physical Therapist             All Therapeutic Exercises/Activities were chosen and performed to address the patient's specific short-term and long-term goals.              PT OP Goals     Row Name 05/21/18 0955          PT Short Term Goals    STG Date to Achieve 08/10/18  -     STG 1 Child and caregiver will be independent with completing home program a minimum of 5 days per week.  -     STG 1 Progress Progressing  -     STG 2 Child will hop on 1 leg 5 times consecutively (bilaterally) to demonstrate improvements with balance and lower extremity strength  -     STG 2 Progress Progressing  -     STG 2 Progress Comments At support surface  -     STG 3 Child will throw ball at target 10 feet away with 80% accuracy to demonstrate improvements with hand eye coordination with age-appropriate skill.  -     STG 3 Progress Progressing  -     STG 3 Progress Comments 25% success  -     STG 4 Child will hold superman position for 20 seconds without rest demonstrate improvements with core strength.  -     STG 4 Progress Progressing  -        Long Term Goals    LTG Date to Achieve 11/10/18   -     LTG 1 Child will complete 10 minutes on UE/LE recumbent bike without rest breaks, in order to demonstrate improvements in endurance.  -     LTG 1 Progress Progressing  -     LTG 1 Progress Comments Rest breaks required  -     LTG 2 Child will complete 4 flights of stairs, using reciprocal pattern and one handrail, without rest break in order to demonstrate improvements with lower extremity strength and endurance with functional activity.  -     LTG 2 Progress Progressing  -     LTG 3 Child will demonstrate lower extremity strength  MMT score 4/5 bilaterally.  -     LTG 3 Progress Progressing  -     LTG 4 Child will complete shuttle run, using reciprocal arm/leg pattern, in less than 12 seconds.  -     LTG 4 Progress Progressing  -     LTG 5 Child will complete 5 pushups on knees without falling to demonstrate improvements with overall strength.   -     LTG 5 Progress New  -        Time Calculation    PT Goal Re-Cert Due Date 06/06/18  -       User Key  (r) = Recorded By, (t) = Taken By, (c) = Cosigned By    Initials Name Provider Type     Kaye Rainey, PT Physical Therapist          Therapy Education  Education Details: Progressing with compliance of HEP.             Time Calculation:   Start Time: 0955  Stop Time: 1104  Time Calculation (min): 69 min  Total Timed Code Minutes- PT: 69 minute(s)    Therapy Charges for Today     Code Description Service Date Service Provider Modifiers Qty    38404559497 HC PT THER PROC EA 15 MIN 5/21/2018 Kaye Rainey, PT GP 5    [id number] HC PT THER SUPP EA 15 MIN 5/21/2018 Kaye Rainey PT GP 1                Kaye Rainey, PT, DPT  5/21/2018      show

## 2021-06-24 NOTE — ED PROVIDER NOTE - PATIENT PORTAL LINK FT
You can access the FollowMyHealth Patient Portal offered by [facility] by registering at the following website: [email or web address]. By joining [facility]’s FollowMyHealth portal, you will also be able to view your health information using other applications (apps) compatible with our system.

## 2021-06-24 NOTE — ED PROVIDER NOTE - OBJECTIVE STATEMENT
61F PMH HLD. GERD, HTN, borderline DM, Arthritis, lupus, s/p gastric sleeve surgery by Dr. Salcido 5/11/21 now p/w pain. Pt states that since the surgery she has persistent epigastric pain and decreased PO intake. Pain is constant but worse w/ any PO intake, even liquids. Was readmitted mid May for similar. Returned to ED today for persistent symptoms. Was having constipation but started stool softeners ~1w ago and that has now resolved. No other systemic symptoms.   No actual vomiting. Able to swallow. Denies f/c, SOB/CP, urinary complaints, URI symptoms, focal weakness/numbness, black/bloody stool.     CT 5/17 showing " 1. Since 2/22/2018, there has been sleeve gastrectomy. Mild postoperative changes in the perigastric region and anterior abdominal wall. No cause for abdominal pain identified. 2. Small pelvic ascites."

## 2021-06-24 NOTE — ED PROVIDER NOTE - NSFOLLOWUPINSTRUCTIONS_ED_ALL_ED_FT
Your potassium was slightly low. Called "hypokalemia."    Stay well hydrated.    Return for persistent fever/vomit, uncontrolled pain, difficulty breathing, worsening lightheaded.    Follow up with Dr. garrison.     Follow up with gastroenterologist for persistent symptoms.     SEEK IMMEDIATE MEDICAL CARE IF YOU HAVE ANY OF THE FOLLOWING SYMPTOMS: worsening abdominal pain, uncontrollable vomiting, profuse diarrhea, inability to have bowel movements or pass gas, black or bloody stools, fever accompanying chest pain or back pain, or fainting. These symptoms may represent a serious problem that is an emergency. Do not wait to see if the symptoms will go away. Get medical help right away. Call 911 and do not drive yourself to the hospital.    Abdominal Pain, Adult    Abdominal pain can be caused by many things. Often, abdominal pain is not serious and it gets better with no treatment or by being treated at home. However, sometimes abdominal pain is serious. Your health care provider will do a medical history and a physical exam to try to determine the cause of your abdominal pain.    Follow these instructions at home:  Take over-the-counter and prescription medicines only as told by your health care provider. Do not take a laxative unless told by your health care provider.  Drink enough fluid to keep your urine clear or pale yellow.  Watch your condition for any changes.  Keep all follow-up visits as told by your health care provider. This is important.    Contact a health care provider if:  Your abdominal pain changes or gets worse.  You are not hungry or you lose weight without trying.  You are constipated or have diarrhea for more than 2–3 days.  You have pain when you urinate or have a bowel movement.  Your abdominal pain wakes you up at night.  Your pain gets worse with meals, after eating, or with certain foods.  You are throwing up and cannot keep anything down.  You have a fever.    Get help right away if:  Your pain does not go away as soon as your health care provider told you to expect.  You cannot stop throwing up.  Your pain is only in areas of the abdomen, such as the right side or the left lower portion of the abdomen.  You have bloody or black stools, or stools that look like tar.  You have severe pain, cramping, or bloating in your abdomen.  You have signs of dehydration, such as:  Dark urine, very little urine, or no urine.  Cracked lips.  Dry mouth.  Sunken eyes.  Sleepiness.  Weakness.     WHAT YOU NEED TO KNOW:    Hypokalemia is a low level of potassium in your blood. Potassium helps control how your muscles, heart, and digestive system work. Hypokalemia occurs when your body loses too much potassium or does not absorb enough from food.     DISCHARGE INSTRUCTIONS:  Return to the emergency department if:   You cannot move your arm or leg.  You have a fast or irregular heartbeat.  You are too tired or weak to stand up.    Contact your healthcare provider if:   You are vomiting, or you have diarrhea.  You have numbness or tingling in your arms or legs.  Your symptoms do not go away or they get worse.  You have questions or concerns about your condition or care.    Medicines:   Potassium will be given to bring your potassium levels back to normal.    Take your medicine as directed. Contact your healthcare provider if you think your medicine is not helping or if you have side effects. Tell him of her if you are allergic to any medicine. Keep a list of the medicines, vitamins, and herbs you take. Include the amounts, and when and why you take them. Bring the list or the pill bottles to follow-up visits. Carry your medicine list with you in case of an emergency.    Eat foods that are high in potassium: Foods that are high in potassium include bananas, oranges, tomatoes, potatoes, and avocado. Kidd beans, turkey, salmon, lean beef, yogurt, and milk are also high in potassium. Ask your healthcare provider or dietitian for more information about foods that are high in potassium.

## 2021-06-24 NOTE — ED ADULT NURSE REASSESSMENT NOTE - NS ED NURSE REASSESS COMMENT FT1
pt was offered options of admission for IVF's and nausea medications or Dc home with pu RX at [facility] and follow up with md garrison. pt states she will go home and get rx tm f/u with surg team md garrison

## 2021-06-24 NOTE — ED PROVIDER NOTE - CARE PLAN
Principal Discharge DX:	Abdominal pain   pt lives in a house w/ 3 steps/rail to enter.  Pt is a community ambulator and is able to drive a car.  Pt report was going to the gym on a daily basis until 5 weeks ago. Principal Discharge DX:	Abdominal pain  Secondary Diagnosis:	Hypokalemia

## 2021-06-24 NOTE — ED ADULT TRIAGE NOTE - OTHER COMPLAINTS
patient reports gastric sleeve surgery 5/11 with decrease PO intake since and abdominal pain, denies fevers/chills

## 2021-06-24 NOTE — ED PROVIDER NOTE - CLINICAL SUMMARY MEDICAL DECISION MAKING FREE TEXT BOX
61F PMH HLD. GERD, HTN, borderline DM, Arthritis, lupus, s/p gastric sleeve surgery by Dr. Salcido 5/11/21 now p/w pain. Pt states that since the surgery she has persistent epigastric pain and decreased PO intake. Pain is constant but worse w/ any PO intake, even liquids. Was readmitted mid May for similar. Returned to ED today for persistent symptoms. Was having constipation but started stool softeners ~1w ago and that has now resolved. No other systemic symptoms. Vitals wnl, exam as above. Benign abdomen.  ddx: Likely related to gastrectomy.   Labs, symptom control, surgery consulted. Will reassess.

## 2021-06-24 NOTE — ED ADULT NURSE NOTE - PRIMARY CARE PROVIDER
Short Stay Endoscopy History and Physical    PCP - Amirah Flowers MD  Referring Physician - Amirah Flowers MD  [address] [place] [address]    Procedure - Colonoscopy  ASA - per anesthesia  Mallampati - per anesthesia  History of Anesthesia problems - no  Family history Anesthesia problems -  no   Plan of anesthesia - General    HPI  76 y.o. female  Reason for procedure: change of bowel habits    ROS:  Constitutional: No fevers, chills, No weight loss  CV: No chest pain  Pulm: No cough, No shortness of breath  GI: see HPI    Medical History:  has a past medical history of Adjustment disorder (2012); ALLERGIC RHINITIS (2012); Allergy; AR (allergic rhinitis) (2012); Arthritis; Cataract; Chronic lymphocytic leukemia; CLL (chronic lymphocytic leukemia); CMC arthritis, thumb, degenerative (1/15/2015); Colon polyp; Depression; Diverticulosis; Dry eye syndrome; Esophageal reflux; Hearing loss, sensorineural (2015); Hyperlipidemia (2012); Interstitial cystitis (2012); Keloid cicatrix; Major depressive disorder, single episode, mild (10/20/2015); PVD (posterior vitreous detachment); Tinnitus, subjective (2015); and Vertigo (10/25/2013).    Surgical History:  has a past surgical history that includes ganglion cyst removed left foot; Liver biopsy;  section, classic; Hysterectomy; Cholecystectomy; and Tonsillectomy.    Family History: family history includes COPD in her son; Cancer in her brother and mother; Colon cancer in her brother; Dementia in her mother; Diabetes in her brother; Heart disease in her father and mother; Hypertension in her mother; Macular degeneration in her mother; No Known Problems in her brother and daughter.. Otherwise no colon cancer, inflammatory bowel disease, or GI malignancies.    Social History:  reports that she has quit smoking. She has a 0.50 pack-year smoking history. She has never used smokeless tobacco. She reports that she  does not drink alcohol or use drugs.    Review of patient's allergies indicates:   Allergen Reactions    Compazine [prochlorperazine edisylate]     Sulfa (sulfonamide antibiotics)        Medications:   Prescriptions Prior to Admission   Medication Sig Dispense Refill Last Dose    ASCORBATE CALCIUM (BAM-C ORAL) Take 1,000 mg by mouth once daily.   10/2/2017 at Unknown time    biotin 1 mg tablet Take 1,000 mcg by mouth once daily.   10/2/2017 at Unknown time    calcium carbonate (OS-JONELLE) 600 mg (1,500 mg) Tab Take 600 mg by mouth 2 (two) times daily with meals.   10/2/2017 at Unknown time    cinnamon bark 500 mg capsule Take 500 mg by mouth once daily.   10/2/2017 at Unknown time    citalopram (CELEXA) 40 MG tablet TAKE 1 TABLET ONE TIME DAILY 90 tablet 3 10/2/2017 at Unknown time    GLUCOSAMINE/MSM/CHONDROITIN A (TRIPLEFLEX ORAL) Take 2 tablets by mouth once daily.   10/2/2017 at Unknown time    loratadine (CLARITIN) 10 mg tablet Take by mouth. 1 Tablet Oral Every day   10/2/2017 at Unknown time    MULTIVIT/IRON/FA/K/HERB NO.244 (ALIVE WOMEN'S ENERGY ORAL) Take 1 tablet by mouth once daily.   10/2/2017 at Unknown time    omeprazole (PRILOSEC) 20 MG capsule TAKE 1 CAPSULE TWICE DAILY 180 capsule 4 10/2/2017 at Unknown time    SACCHAROMYCES BOULARDII (PROBIOTIC, S.BOULARDII, ORAL) Take 1 capsule by mouth once daily.   10/2/2017 at Unknown time    VIT C/VIT E AC/LUT/COPPER/ZINC (PRESERVISION LUTEIN ORAL) Take 1 tablet by mouth once daily.   10/2/2017 at Unknown time    artificial tear, hypromellose, (GENTEAL  MILD TO MODERATE) 0.3 % Drop Inject into the eye. 1 Gel Both eyes TID-QID   Taking    cetirizine (ZYRTEC) 10 MG tablet Take 1 tablet (10 mg total) by mouth once daily. 30 tablet 0 Taking    fluticasone (FLONASE) 50 mcg/actuation nasal spray 1 spray by Each Nare route daily as needed for Rhinitis. 9.9 g 6 More than a month at Unknown time    meclizine (ANTIVERT) 25 mg tablet 1/2-1 tablet every 6  hours as needed for dizziness 30 tablet 1 More than a month at Unknown time       Physical Exam:    Vital Signs:   Vitals:    10/03/17 0903   BP: 130/64   Pulse: 68   Resp: 16   Temp: 97.5 °F (36.4 °C)       General Appearance: Well appearing in no acute distress  Lungs: CTA anteriorly  Heart:  Regular rate, S1, S2 normal, no murmurs heard.  Abdomen: Soft, non tender, non distended with normal bowel sounds, no masses  Extremities: No edema    Labs:  Lab Results   Component Value Date    WBC 23.37 (H) 09/13/2017    HGB 14.2 09/13/2017    HCT 42.0 09/13/2017     09/13/2017    CHOL 222 (H) 09/13/2017    TRIG 123 09/13/2017    HDL 55 09/13/2017    ALT 34 09/13/2017    AST 37 09/13/2017     09/13/2017    K 4.2 09/13/2017     09/13/2017    CREATININE 0.8 09/13/2017    BUN 13 09/13/2017    CO2 30 (H) 09/13/2017    TSH 1.259 09/13/2017    INR 1.0 01/06/2009    HGBA1C 6.1 05/29/2008       I have explained the risks and benefits of this endoscopic procedure to the patient including but not limited to bleeding, inflammation, infection, perforation, and death.      Kush Ramesh MD     nonaffiliated

## 2021-07-02 ENCOUNTER — OUTPATIENT (OUTPATIENT)
Dept: OUTPATIENT SERVICES | Facility: HOSPITAL | Age: 61
LOS: 1 days | End: 2021-07-02
Payer: COMMERCIAL

## 2021-07-02 ENCOUNTER — APPOINTMENT (OUTPATIENT)
Dept: RADIOLOGY | Facility: HOSPITAL | Age: 61
End: 2021-07-02

## 2021-07-02 DIAGNOSIS — Z98.890 OTHER SPECIFIED POSTPROCEDURAL STATES: Chronic | ICD-10-CM

## 2021-07-02 PROCEDURE — 74240 X-RAY XM UPR GI TRC 1CNTRST: CPT | Mod: 26

## 2021-07-02 PROCEDURE — 74240 X-RAY XM UPR GI TRC 1CNTRST: CPT

## 2021-07-02 RX ORDER — OMEPRAZOLE 40 MG/1
40 CAPSULE, DELAYED RELEASE ORAL
Qty: 30 | Refills: 0 | Status: ACTIVE | COMMUNITY
Start: 2021-07-02 | End: 1900-01-01

## 2021-07-07 ENCOUNTER — APPOINTMENT (OUTPATIENT)
Dept: BARIATRICS | Facility: CLINIC | Age: 61
End: 2021-07-07
Payer: COMMERCIAL

## 2021-07-07 PROCEDURE — 99024 POSTOP FOLLOW-UP VISIT: CPT

## 2021-07-07 NOTE — END OF VISIT
[FreeTextEntry3] : All medical record entries made by the Scribe were at my, Dr. Salcido's, discretion and personally dictated by me on 07/07/2021. I have reviewed the chart and agree that the record accurately reflects my personal performance of the history, physical exam, assessment and plan. I have also personally directed, reviewed and agreed to the chart.

## 2021-07-07 NOTE — ADDENDUM
[FreeTextEntry1] : This note was written by Ethel Dillard on 07/07/2021 acting as scribe for Dr. Salcido.

## 2021-07-07 NOTE — HISTORY OF PRESENT ILLNESS
[de-identified] : Maddi Solorzano is a 60 y/o F who returns to see us s/p lap sleeve gastrectomy 5/11/21 and with UGI series 7/2/21 which shows narrowing of the body, but fills out well and seems to pass right through, but she is still complaining of difficulty with PO. Truly doesn't look much different than most sleeves, which will have narrowing at body as this is the un-distendable part of stomach, but with her symptoms I advised her to try frozen fruit pops and willard. If she is unable to tolerate this, will have her come in for EGD with Hernan Quesada and possible dilation.

## 2021-07-08 ENCOUNTER — INPATIENT (INPATIENT)
Facility: HOSPITAL | Age: 61
LOS: 2 days | Discharge: HOME | DRG: 392 | End: 2021-07-11
Attending: SURGERY | Admitting: SURGERY
Payer: COMMERCIAL

## 2021-07-08 VITALS
HEIGHT: 68 IN | RESPIRATION RATE: 18 BRPM | TEMPERATURE: 98 F | DIASTOLIC BLOOD PRESSURE: 84 MMHG | HEART RATE: 86 BPM | SYSTOLIC BLOOD PRESSURE: 134 MMHG | OXYGEN SATURATION: 98 %

## 2021-07-08 DIAGNOSIS — Z98.890 OTHER SPECIFIED POSTPROCEDURAL STATES: Chronic | ICD-10-CM

## 2021-07-08 LAB
ACANTHOCYTES BLD QL SMEAR: SLIGHT — SIGNIFICANT CHANGE UP
ALBUMIN SERPL ELPH-MCNC: 3.8 G/DL — SIGNIFICANT CHANGE UP (ref 3.3–5)
ALP SERPL-CCNC: 81 U/L — SIGNIFICANT CHANGE UP (ref 40–120)
ALT FLD-CCNC: 65 U/L — HIGH (ref 10–45)
ANION GAP SERPL CALC-SCNC: 17 MMOL/L — SIGNIFICANT CHANGE UP (ref 5–17)
ANION GAP SERPL CALC-SCNC: 18 MMOL/L — HIGH (ref 5–17)
ANISOCYTOSIS BLD QL: SLIGHT — SIGNIFICANT CHANGE UP
APPEARANCE UR: CLEAR — SIGNIFICANT CHANGE UP
APTT BLD: 28.6 SEC — SIGNIFICANT CHANGE UP (ref 27.5–35.5)
AST SERPL-CCNC: 46 U/L — HIGH (ref 10–40)
BACTERIA # UR AUTO: PRESENT /HPF
BASOPHILS # BLD AUTO: 0 K/UL — SIGNIFICANT CHANGE UP (ref 0–0.2)
BASOPHILS NFR BLD AUTO: 0 % — SIGNIFICANT CHANGE UP (ref 0–2)
BILIRUB SERPL-MCNC: 1.3 MG/DL — HIGH (ref 0.2–1.2)
BILIRUB UR-MCNC: ABNORMAL
BLD GP AB SCN SERPL QL: NEGATIVE — SIGNIFICANT CHANGE UP
BUN SERPL-MCNC: 10 MG/DL — SIGNIFICANT CHANGE UP (ref 7–23)
BUN SERPL-MCNC: 8 MG/DL — SIGNIFICANT CHANGE UP (ref 7–23)
BURR CELLS BLD QL SMEAR: PRESENT — SIGNIFICANT CHANGE UP
CALCIUM SERPL-MCNC: 10 MG/DL — SIGNIFICANT CHANGE UP (ref 8.4–10.5)
CALCIUM SERPL-MCNC: 9.1 MG/DL — SIGNIFICANT CHANGE UP (ref 8.4–10.5)
CHLORIDE SERPL-SCNC: 103 MMOL/L — SIGNIFICANT CHANGE UP (ref 96–108)
CHLORIDE SERPL-SCNC: 99 MMOL/L — SIGNIFICANT CHANGE UP (ref 96–108)
CO2 SERPL-SCNC: 22 MMOL/L — SIGNIFICANT CHANGE UP (ref 22–31)
CO2 SERPL-SCNC: 27 MMOL/L — SIGNIFICANT CHANGE UP (ref 22–31)
COLOR SPEC: SIGNIFICANT CHANGE UP
CREAT SERPL-MCNC: 0.55 MG/DL — SIGNIFICANT CHANGE UP (ref 0.5–1.3)
CREAT SERPL-MCNC: 0.67 MG/DL — SIGNIFICANT CHANGE UP (ref 0.5–1.3)
DACRYOCYTES BLD QL SMEAR: SLIGHT — SIGNIFICANT CHANGE UP
DIFF PNL FLD: ABNORMAL
EOSINOPHIL # BLD AUTO: 0.12 K/UL — SIGNIFICANT CHANGE UP (ref 0–0.5)
EOSINOPHIL NFR BLD AUTO: 1.8 % — SIGNIFICANT CHANGE UP (ref 0–6)
EPI CELLS # UR: ABNORMAL /HPF (ref 0–5)
GIANT PLATELETS BLD QL SMEAR: PRESENT — SIGNIFICANT CHANGE UP
GLUCOSE SERPL-MCNC: 76 MG/DL — SIGNIFICANT CHANGE UP (ref 70–99)
GLUCOSE SERPL-MCNC: 99 MG/DL — SIGNIFICANT CHANGE UP (ref 70–99)
GLUCOSE UR QL: 100
HCT VFR BLD CALC: 40.8 % — SIGNIFICANT CHANGE UP (ref 34.5–45)
HGB BLD-MCNC: 12.7 G/DL — SIGNIFICANT CHANGE UP (ref 11.5–15.5)
HYALINE CASTS # UR AUTO: ABNORMAL /LPF (ref 0–2)
HYPOCHROMIA BLD QL: SLIGHT — SIGNIFICANT CHANGE UP
INR BLD: 1.23 — HIGH (ref 0.88–1.16)
KETONES UR-MCNC: >=80 MG/DL
LACTATE SERPL-SCNC: 1.6 MMOL/L — SIGNIFICANT CHANGE UP (ref 0.5–2)
LEUKOCYTE ESTERASE UR-ACNC: NEGATIVE — SIGNIFICANT CHANGE UP
LIDOCAIN IGE QN: 23 U/L — SIGNIFICANT CHANGE UP (ref 7–60)
LYMPHOCYTES # BLD AUTO: 1.05 K/UL — SIGNIFICANT CHANGE UP (ref 1–3.3)
LYMPHOCYTES # BLD AUTO: 15.8 % — SIGNIFICANT CHANGE UP (ref 13–44)
MAGNESIUM SERPL-MCNC: 2 MG/DL — SIGNIFICANT CHANGE UP (ref 1.6–2.6)
MANUAL SMEAR VERIFICATION: SIGNIFICANT CHANGE UP
MCHC RBC-ENTMCNC: 22.1 PG — LOW (ref 27–34)
MCHC RBC-ENTMCNC: 31.1 GM/DL — LOW (ref 32–36)
MCV RBC AUTO: 71.1 FL — LOW (ref 80–100)
MONOCYTES # BLD AUTO: 0.59 K/UL — SIGNIFICANT CHANGE UP (ref 0–0.9)
MONOCYTES NFR BLD AUTO: 8.8 % — SIGNIFICANT CHANGE UP (ref 2–14)
NEUTROPHILS # BLD AUTO: 4.74 K/UL — SIGNIFICANT CHANGE UP (ref 1.8–7.4)
NEUTROPHILS NFR BLD AUTO: 71 % — SIGNIFICANT CHANGE UP (ref 43–77)
NITRITE UR-MCNC: NEGATIVE — SIGNIFICANT CHANGE UP
NRBC # BLD: 1 /100 — HIGH (ref 0–0)
NRBC # BLD: SIGNIFICANT CHANGE UP /100 WBCS (ref 0–0)
OVALOCYTES BLD QL SMEAR: SLIGHT — SIGNIFICANT CHANGE UP
PH UR: 6.5 — SIGNIFICANT CHANGE UP (ref 5–8)
PLAT MORPH BLD: ABNORMAL
PLATELET # BLD AUTO: 228 K/UL — SIGNIFICANT CHANGE UP (ref 150–400)
POIKILOCYTOSIS BLD QL AUTO: SLIGHT — SIGNIFICANT CHANGE UP
POLYCHROMASIA BLD QL SMEAR: SLIGHT — SIGNIFICANT CHANGE UP
POTASSIUM SERPL-MCNC: 2.9 MMOL/L — CRITICAL LOW (ref 3.5–5.3)
POTASSIUM SERPL-MCNC: 3.2 MMOL/L — LOW (ref 3.5–5.3)
POTASSIUM SERPL-SCNC: 2.9 MMOL/L — CRITICAL LOW (ref 3.5–5.3)
POTASSIUM SERPL-SCNC: 3.2 MMOL/L — LOW (ref 3.5–5.3)
PROT SERPL-MCNC: 7.3 G/DL — SIGNIFICANT CHANGE UP (ref 6–8.3)
PROT UR-MCNC: 100 MG/DL
PROTHROM AB SERPL-ACNC: 14.6 SEC — HIGH (ref 10.6–13.6)
RBC # BLD: 5.74 M/UL — HIGH (ref 3.8–5.2)
RBC # FLD: 17.4 % — HIGH (ref 10.3–14.5)
RBC BLD AUTO: ABNORMAL
RBC CASTS # UR COMP ASSIST: < 5 /HPF — SIGNIFICANT CHANGE UP
RH IG SCN BLD-IMP: POSITIVE — SIGNIFICANT CHANGE UP
SARS-COV-2 RNA SPEC QL NAA+PROBE: SIGNIFICANT CHANGE UP
SCHISTOCYTES BLD QL AUTO: SLIGHT — SIGNIFICANT CHANGE UP
SMUDGE CELLS # BLD: PRESENT — SIGNIFICANT CHANGE UP
SODIUM SERPL-SCNC: 142 MMOL/L — SIGNIFICANT CHANGE UP (ref 135–145)
SODIUM SERPL-SCNC: 144 MMOL/L — SIGNIFICANT CHANGE UP (ref 135–145)
SP GR SPEC: >=1.03 — SIGNIFICANT CHANGE UP (ref 1–1.03)
SPHEROCYTES BLD QL SMEAR: SLIGHT — SIGNIFICANT CHANGE UP
STOMATOCYTES BLD QL SMEAR: SLIGHT — SIGNIFICANT CHANGE UP
UROBILINOGEN FLD QL: 4 E.U./DL
VARIANT LYMPHS # BLD: 2.6 % — SIGNIFICANT CHANGE UP (ref 0–6)
WBC # BLD: 6.67 K/UL — SIGNIFICANT CHANGE UP (ref 3.8–10.5)
WBC # FLD AUTO: 6.67 K/UL — SIGNIFICANT CHANGE UP (ref 3.8–10.5)
WBC UR QL: ABNORMAL /HPF

## 2021-07-08 PROCEDURE — 71045 X-RAY EXAM CHEST 1 VIEW: CPT | Mod: 26

## 2021-07-08 PROCEDURE — 93010 ELECTROCARDIOGRAM REPORT: CPT

## 2021-07-08 PROCEDURE — 99285 EMERGENCY DEPT VISIT HI MDM: CPT

## 2021-07-08 RX ORDER — POTASSIUM CHLORIDE 20 MEQ
40 PACKET (EA) ORAL ONCE
Refills: 0 | Status: COMPLETED | OUTPATIENT
Start: 2021-07-08 | End: 2021-07-08

## 2021-07-08 RX ORDER — PREGABALIN 225 MG/1
1000 CAPSULE ORAL ONCE
Refills: 0 | Status: COMPLETED | OUTPATIENT
Start: 2021-07-08 | End: 2021-07-08

## 2021-07-08 RX ORDER — POTASSIUM CHLORIDE 20 MEQ
10 PACKET (EA) ORAL ONCE
Refills: 0 | Status: COMPLETED | OUTPATIENT
Start: 2021-07-08 | End: 2021-07-08

## 2021-07-08 RX ORDER — SODIUM CHLORIDE 9 MG/ML
500 INJECTION, SOLUTION INTRAVENOUS ONCE
Refills: 0 | Status: COMPLETED | OUTPATIENT
Start: 2021-07-08 | End: 2021-07-08

## 2021-07-08 RX ORDER — MAGNESIUM SULFATE 500 MG/ML
2 VIAL (ML) INJECTION
Refills: 0 | Status: DISCONTINUED | OUTPATIENT
Start: 2021-07-08 | End: 2021-07-08

## 2021-07-08 RX ORDER — SODIUM CHLORIDE 9 MG/ML
1000 INJECTION, SOLUTION INTRAVENOUS
Refills: 0 | Status: DISCONTINUED | OUTPATIENT
Start: 2021-07-08 | End: 2021-07-09

## 2021-07-08 RX ORDER — POTASSIUM CHLORIDE 20 MEQ
10 PACKET (EA) ORAL
Refills: 0 | Status: COMPLETED | OUTPATIENT
Start: 2021-07-08 | End: 2021-07-08

## 2021-07-08 RX ORDER — ONDANSETRON 8 MG/1
4 TABLET, FILM COATED ORAL ONCE
Refills: 0 | Status: COMPLETED | OUTPATIENT
Start: 2021-07-08 | End: 2021-07-08

## 2021-07-08 RX ORDER — THIAMINE MONONITRATE (VIT B1) 100 MG
100 TABLET ORAL ONCE
Refills: 0 | Status: COMPLETED | OUTPATIENT
Start: 2021-07-08 | End: 2021-07-08

## 2021-07-08 RX ORDER — POTASSIUM CHLORIDE 20 MEQ
10 PACKET (EA) ORAL ONCE
Refills: 0 | Status: DISCONTINUED | OUTPATIENT
Start: 2021-07-08 | End: 2021-07-08

## 2021-07-08 RX ORDER — PANTOPRAZOLE SODIUM 20 MG/1
40 TABLET, DELAYED RELEASE ORAL DAILY
Refills: 0 | Status: DISCONTINUED | OUTPATIENT
Start: 2021-07-08 | End: 2021-07-11

## 2021-07-08 RX ORDER — MORPHINE SULFATE 50 MG/1
4 CAPSULE, EXTENDED RELEASE ORAL ONCE
Refills: 0 | Status: DISCONTINUED | OUTPATIENT
Start: 2021-07-08 | End: 2021-07-08

## 2021-07-08 RX ORDER — THIAMINE MONONITRATE (VIT B1) 100 MG
30 TABLET ORAL ONCE
Refills: 0 | Status: DISCONTINUED | OUTPATIENT
Start: 2021-07-08 | End: 2021-07-08

## 2021-07-08 RX ORDER — FOLIC ACID 0.8 MG
1 TABLET ORAL ONCE
Refills: 0 | Status: COMPLETED | OUTPATIENT
Start: 2021-07-08 | End: 2021-07-08

## 2021-07-08 RX ORDER — ONDANSETRON 8 MG/1
4 TABLET, FILM COATED ORAL EVERY 6 HOURS
Refills: 0 | Status: DISCONTINUED | OUTPATIENT
Start: 2021-07-08 | End: 2021-07-11

## 2021-07-08 RX ADMIN — PANTOPRAZOLE SODIUM 40 MILLIGRAM(S): 20 TABLET, DELAYED RELEASE ORAL at 18:52

## 2021-07-08 RX ADMIN — MORPHINE SULFATE 4 MILLIGRAM(S): 50 CAPSULE, EXTENDED RELEASE ORAL at 08:30

## 2021-07-08 RX ADMIN — Medication 100 MILLIEQUIVALENT(S): at 20:57

## 2021-07-08 RX ADMIN — SODIUM CHLORIDE 500 MILLILITER(S): 9 INJECTION, SOLUTION INTRAVENOUS at 08:30

## 2021-07-08 RX ADMIN — Medication 100 MILLIEQUIVALENT(S): at 19:10

## 2021-07-08 RX ADMIN — Medication 100 MILLIEQUIVALENT(S): at 12:15

## 2021-07-08 RX ADMIN — SODIUM CHLORIDE 120 MILLILITER(S): 9 INJECTION, SOLUTION INTRAVENOUS at 10:34

## 2021-07-08 RX ADMIN — Medication 100 MILLIEQUIVALENT(S): at 14:26

## 2021-07-08 RX ADMIN — Medication 100 MILLIEQUIVALENT(S): at 22:50

## 2021-07-08 RX ADMIN — ONDANSETRON 4 MILLIGRAM(S): 8 TABLET, FILM COATED ORAL at 12:14

## 2021-07-08 RX ADMIN — Medication 50 GRAM(S): at 17:02

## 2021-07-08 RX ADMIN — Medication 100 MILLIEQUIVALENT(S): at 17:02

## 2021-07-08 RX ADMIN — Medication 40 MILLIEQUIVALENT(S): at 09:10

## 2021-07-08 RX ADMIN — Medication 100 MILLIGRAM(S): at 10:04

## 2021-07-08 RX ADMIN — ONDANSETRON 4 MILLIGRAM(S): 8 TABLET, FILM COATED ORAL at 09:10

## 2021-07-08 RX ADMIN — ONDANSETRON 4 MILLIGRAM(S): 8 TABLET, FILM COATED ORAL at 18:50

## 2021-07-08 RX ADMIN — PREGABALIN 1000 MICROGRAM(S): 225 CAPSULE ORAL at 12:05

## 2021-07-08 RX ADMIN — Medication 1 MILLIGRAM(S): at 12:06

## 2021-07-08 RX ADMIN — Medication 100 MILLIEQUIVALENT(S): at 09:29

## 2021-07-08 RX ADMIN — Medication 100 MILLIEQUIVALENT(S): at 10:34

## 2021-07-08 NOTE — H&P ADULT - NSICDXPASTMEDICALHX_GEN_ALL_CORE_FT
PAST MEDICAL HISTORY:  Anemia normocytic    Asthma     Connective tissue disease diffuse    GERD (gastroesophageal reflux disease)     HTN (hypertension)     Hyperlipidemia     Lupus     Morbid obesity     Prediabetes

## 2021-07-08 NOTE — H&P ADULT - HISTORY OF PRESENT ILLNESS
Patient is a 61 year old female with a history of asthma, HTN, hyperlipidemia, GERD, borderline DM, and morbid obesity s/p gastric sleeve gastrectomy by Dr. Salcido 5/11/21 who presents via walk-in for abdominal pain. Patient's post-operative course was uncomplicated and she was discharged on liquid diet. Patient re-admitted 5/17 for PO intolerance and abdominal discomfort; CT abd/pelvis at that time showed mild post-surgical changes. Patient re-presented to ED on 6/24/21 with similar complaints; patient admitted for further managemnt and monitoring; diet advanced and patient discharged tolerating liquids following unremarkable CT abd/pelvis. Patient was seen by Dr. Salcido in outpatient clinic yesterday with advisement that recurrent symptoms will prompt UGI endoscopy with possible dilation with Dr. Quesada.  Today, Ms. Solorzano says that she has been experiencing intermittent left-sided abdominal discomfort characterized as "tightness" that occasionally radiates to her back, is aggravated with PO intake and mildly alleviated with ambulation. She further endorses associated constipation (last BM 10 days ago), nausea and repeated "gagging" although denies any emesis. She currently notes limited PO intake; has consumed 1/2L water in the past 24hours. Patient denies any fevers, chills, CP, or SOB. Of note, patient has lost ~30lbs since her surgery.

## 2021-07-08 NOTE — CONSULT NOTE ADULT - SUBJECTIVE AND OBJECTIVE BOX
GASTROENTEROLOGY CONSULT NOTE  HPI:  Patient is a 61 year old female with a history of asthma, HTN, hyperlipidemia, GERD, borderline DM, and morbid obesity s/p gastric sleeve gastrectomy by Dr. Salcido 5/11/21 who presents via walk-in for abdominal pain. Patient's post-operative course was uncomplicated and she was discharged on liquid diet. Patient re-admitted 5/17 for PO intolerance and abdominal discomfort; CT abd/pelvis at that time showed mild post-surgical changes. Patient re-presented to ED on 6/24/21 with similar complaints; patient admitted for further managemnt and monitoring; diet advanced and patient discharged tolerating liquids following unremarkable CT abd/pelvis. Patient was seen by Dr. Salcdio in outpatient clinic yesterday with advisement that recurrent symptoms will prompt UGI endoscopy with possible dilation with Dr. Quesada.  Today, Ms. Solorzano says that she has been experiencing intermittent left-sided abdominal discomfort characterized as "tightness" that occasionally radiates to her back, is aggravated with PO intake and mildly alleviated with ambulation. She further endorses associated constipation (last BM 10 days ago), nausea and repeated "gagging" although denies any emesis. She currently notes limited PO intake; has consumed 1/2L water in the past 24hours. Patient denies any fevers, chills, CP, or SOB. Of note, patient has lost ~30lbs since her surgery.    (08 Jul 2021 09:04)    Allergies    Bananas (Rash)  bee sting loss of consciousness,skin burn (Other)  latex (Hives; Pruritus)  levofloxacin (Hives)  penicillins (Short breath)  pine tree (Hives; Rash)  quinolines (Short breath)  Seafood (Short breath)  sulfa drugs (Short breath)    Intolerances      Home Medications:  albuterol 0.63 mg/3 mL (0.021%) inhalation solution: 3 milliliter(s) inhaled 3 times a day, As Needed (24 Jun 2021 20:34)  amLODIPine:  (24 Jun 2021 20:34)  ezetimibe 10 mg oral tablet: 1 tab(s) orally once a day (24 Jun 2021 20:34)  vitaminD2: 125 milligram(s) orally once a day (11 May 2021 08:02)    MEDICATIONS:  MEDICATIONS  (STANDING):  lactated ringers. 1000 milliLiter(s) (120 mL/Hr) IV Continuous <Continuous>  pantoprazole  Injectable 40 milliGRAM(s) IV Push daily  potassium chloride  10 mEq/100 mL IVPB 10 milliEquivalent(s) IV Intermittent every 1 hour    MEDICATIONS  (PRN):  ondansetron Injectable 4 milliGRAM(s) IV Push every 6 hours PRN Nausea and/or Vomiting    PAST MEDICAL & SURGICAL HISTORY:  Morbid obesity    HTN (hypertension)    Asthma    Hyperlipidemia    Anemia  normocytic    Connective tissue disease  diffuse    Prediabetes    Lupus    GERD (gastroesophageal reflux disease)    History of ankle surgery    History of knee surgery      FAMILY HISTORY:    SOCIAL HISTORY:  As above    REVIEW OF SYSTEMS:  CONSTITUTIONAL: No weakness, fevers or chills  HEENT: No visual changes; No vertigo or throat pain   NECK: No pain or stiffness  RESPIRATORY: No cough, wheezing, hemoptysis; No shortness of breath  CARDIOVASCULAR: No chest pain or palpitations  GASTROINTESTINAL: As above  GENITOURINARY: No dysuria, frequency or hematuria  NEUROLOGICAL: No numbness or weakness  SKIN: No itching, burning, rashes, or lesions   All other 10 review of systems is negative unless indicated above.    Vital Signs Last 24 Hrs  T(C): 36.8 (08 Jul 2021 12:34), Max: 36.8 (08 Jul 2021 12:34)  T(F): 98.2 (08 Jul 2021 12:34), Max: 98.2 (08 Jul 2021 12:34)  HR: 68 (08 Jul 2021 12:34) (65 - 86)  BP: 127/77 (08 Jul 2021 12:34) (127/77 - 134/84)  BP(mean): --  RR: 18 (08 Jul 2021 12:34) (18 - 18)  SpO2: 98% (08 Jul 2021 12:34) (98% - 100%)      PHYSICAL EXAM:    General: Well developed; well nourished; in no acute distress  Eyes: Anicteric sclerae, moist conjunctivae  HENT: Moist mucous membranes  Neck: Trachea midline, supple  Lungs: Normal respiratory effort, no intercostal retractions  Cardiovascular: RRR  Abdomen: Soft, obese, non-distended, TTP in the LUQ  Extremities: Normal range of motion, No clubbing, cyanosis or edema  Neurological: Alert and oriented x3  Skin: Warm and dry. No obvious rash    LABS:                        12.7   6.67  )-----------( 228      ( 08 Jul 2021 07:55 )             40.8     07-08    144  |  99  |  10  ----------------------------<  99  2.9<LL>   |  27  |  0.67    Ca    10.0      08 Jul 2021 07:55  Phos  3.1     07-08  Mg     1.7     07-08    TPro  7.3  /  Alb  3.8  /  TBili  1.3<H>  /  DBili  x   /  AST  46<H>  /  ALT  65<H>  /  AlkPhos  81  07-08        PT/INR - ( 08 Jul 2021 07:55 )   PT: 14.6 sec;   INR: 1.23          PTT - ( 08 Jul 2021 07:55 )  PTT:28.6 sec    RADIOLOGY & ADDITIONAL STUDIES:     Reviewed

## 2021-07-08 NOTE — ED PROVIDER NOTE - ATTENDING CONTRIBUTION TO CARE
62 y/o f hx gastric sleeve surgery with Dr. Salcido 5/2021 presents c/o persistent left upper abd pain, PO intolerance since the surgery; pt afebrile in ED, vss, labs unremarkable.  Pt evaluated by surgery in ED, discussed with Dr. Salcido who will admit, plan on EGD    Agree with above note as documented by PA.  I was available as the supervising attending during patient's ER evaluation.

## 2021-07-08 NOTE — ED ADULT NURSE NOTE - OBJECTIVE STATEMENT
61 . F ambulatory for pre op screening. Patient endorsing abdominal pain, nausea , post gastric sleeve done May 11Th. Patient denies sob, cp, vomiting , fevers .

## 2021-07-08 NOTE — ED PROVIDER NOTE - OBJECTIVE STATEMENT
60 y/o f s/p gastric sleeve surgery with Dr. Salcido 5/2021 presents c/o persistent LUQ pain since the surgery, unable to tolerate PO.  Pt stating she is feeling nauseous, early satiety.  Pt has been seen multiple times in the ED for this with negative CTs, had upper GI series last week which showed possible area of narrowing and pt stating Dr. Salcido advised her to come to ED if sx weren't improving.  Pt denies fever, chills, vomiting, diarrhea, all other ROS negative.

## 2021-07-08 NOTE — ED PROVIDER NOTE - CLINICAL SUMMARY MEDICAL DECISION MAKING FREE TEXT BOX
62 y/o f hx gastric sleeve surgery with Dr. Salcido 5/2021 presents c/o persistent left upper abd pain, PO intolerance since the surgery; pt afebrile in ED, vss, labs unremarkable.  Pt evaluated by surgery in ED, discussed with Dr. Salcido who will admit, plan on EGD

## 2021-07-08 NOTE — CONSULT NOTE ADULT - ASSESSMENT
61yF w/Hx of asthma, HTN, HLD, GERD, preDM, obesity s/p gastric sleeve on 5/11 presenting with abdominal pain and PO intolerance. GI consulted for endoscopic evaluation.    1. Abdominal pain - Patient with LUQ pain and poor PO tolerance, reportedly since operation with ~30lbs of weight loss since May. Recent upper GI series with narrowing of gastric body, not causing obstruction on imaging, but patient reports inability to tolerate any PO intake (liquid or solid).   - Aggressive electrolyte repletion  - NPO at MN for tentative EGD +/- dilation tomorrow    Recommendations discussed with primary team  Case discussed with attending physician

## 2021-07-08 NOTE — H&P ADULT - NSHPPHYSICALEXAM_GEN_ALL_CORE
VITAL SIGNS:  T(F): 97.7 (07-08-21 @ 07:30), Max: 97.7 (07-08-21 @ 07:30)  HR: 86 (07-08-21 @ 07:30) (86 - 86)  BP: 134/84 (07-08-21 @ 07:30) (134/84 - 134/84)  BP(mean): --  RR: 18 (07-08-21 @ 07:30) (18 - 18)  SpO2: 98% (07-08-21 @ 07:30) (98% - 98%)    PHYSICAL EXAM:    Constitutional: WDWN resting comfortably in bed; NAD. Appears mildly fatigued.   Neck: supple; no JVD or thyromegaly  Cardiac- normal rate  Respiratory- no acute respiratory distress,  patent airway, patient speaking in complete sentences  Abdomen- abdomen soft, mildly distended, mild TTP LUQ; no rebound tenderness or guarding. Well healed surgical incision.  Extremities- warm and well-perfused

## 2021-07-08 NOTE — H&P ADULT - ASSESSMENT
Patient is a 61 year old female with a history of asthma, HTN, hyperlipidemia, GERD, borderline DM, and morbid obesity s/p gastric sleeve gastrectomy by Dr. Salcido 5/11/21 who presents via walk-in for abdominal pain and PO intolerance. Patient seen at bedside and is     - Case discussed with Dr. Salcido and chief resident  - Recommend admission with GI consult for UGI endoscopy w/ possible dilation with Dr. Quesada  - Regular bariatric diet  - Home medication re-instatement  - Thiamine, B12 provided  - VTE prophylaxis: SCD's Patient is a 61 year old female with a history of asthma, HTN, hyperlipidemia, GERD, borderline DM, and morbid obesity s/p gastric sleeve gastrectomy by Dr. Salcido 5/11/21 who presents via walk-in for abdominal pain and PO intolerance. Patient seen at bedside and is stable.     - Case discussed with Dr. Salcido and chief resident  - Recommend admission with GI consult for UGI endoscopy w/ possible dilation with Dr. Quesada  - Regular bariatric diet  - Home medication re-instatement  - Thiamine, B12 provided  - VTE prophylaxis: SCD's

## 2021-07-09 LAB
ANION GAP SERPL CALC-SCNC: 15 MMOL/L — SIGNIFICANT CHANGE UP (ref 5–17)
BUN SERPL-MCNC: 7 MG/DL — SIGNIFICANT CHANGE UP (ref 7–23)
CALCIUM SERPL-MCNC: 8.7 MG/DL — SIGNIFICANT CHANGE UP (ref 8.4–10.5)
CHLORIDE SERPL-SCNC: 102 MMOL/L — SIGNIFICANT CHANGE UP (ref 96–108)
CO2 SERPL-SCNC: 24 MMOL/L — SIGNIFICANT CHANGE UP (ref 22–31)
COVID-19 SPIKE DOMAIN AB INTERP: NEGATIVE — SIGNIFICANT CHANGE UP
COVID-19 SPIKE DOMAIN ANTIBODY RESULT: 0.4 U/ML — SIGNIFICANT CHANGE UP
CREAT SERPL-MCNC: 0.53 MG/DL — SIGNIFICANT CHANGE UP (ref 0.5–1.3)
GLUCOSE SERPL-MCNC: 71 MG/DL — SIGNIFICANT CHANGE UP (ref 70–99)
HCT VFR BLD CALC: 33.6 % — LOW (ref 34.5–45)
HGB BLD-MCNC: 10.2 G/DL — LOW (ref 11.5–15.5)
MAGNESIUM SERPL-MCNC: 1.8 MG/DL — SIGNIFICANT CHANGE UP (ref 1.6–2.6)
MCHC RBC-ENTMCNC: 22.2 PG — LOW (ref 27–34)
MCHC RBC-ENTMCNC: 30.4 GM/DL — LOW (ref 32–36)
MCV RBC AUTO: 73.2 FL — LOW (ref 80–100)
NRBC # BLD: 0 /100 WBCS — SIGNIFICANT CHANGE UP (ref 0–0)
PHOSPHATE SERPL-MCNC: 2.5 MG/DL — SIGNIFICANT CHANGE UP (ref 2.5–4.5)
PLATELET # BLD AUTO: 172 K/UL — SIGNIFICANT CHANGE UP (ref 150–400)
POTASSIUM SERPL-MCNC: 3.3 MMOL/L — LOW (ref 3.5–5.3)
POTASSIUM SERPL-SCNC: 3.3 MMOL/L — LOW (ref 3.5–5.3)
RBC # BLD: 4.59 M/UL — SIGNIFICANT CHANGE UP (ref 3.8–5.2)
RBC # FLD: 16.7 % — HIGH (ref 10.3–14.5)
SARS-COV-2 IGG+IGM SERPL QL IA: 0.4 U/ML — SIGNIFICANT CHANGE UP
SARS-COV-2 IGG+IGM SERPL QL IA: NEGATIVE — SIGNIFICANT CHANGE UP
SODIUM SERPL-SCNC: 141 MMOL/L — SIGNIFICANT CHANGE UP (ref 135–145)
WBC # BLD: 5.08 K/UL — SIGNIFICANT CHANGE UP (ref 3.8–10.5)
WBC # FLD AUTO: 5.08 K/UL — SIGNIFICANT CHANGE UP (ref 3.8–10.5)

## 2021-07-09 RX ORDER — MAGNESIUM SULFATE 500 MG/ML
1 VIAL (ML) INJECTION ONCE
Refills: 0 | Status: COMPLETED | OUTPATIENT
Start: 2021-07-09 | End: 2021-07-09

## 2021-07-09 RX ORDER — PANTOPRAZOLE SODIUM 20 MG/1
40 TABLET, DELAYED RELEASE ORAL ONCE
Refills: 0 | Status: COMPLETED | OUTPATIENT
Start: 2021-07-09 | End: 2021-07-09

## 2021-07-09 RX ORDER — POTASSIUM PHOSPHATE, MONOBASIC POTASSIUM PHOSPHATE, DIBASIC 236; 224 MG/ML; MG/ML
15 INJECTION, SOLUTION INTRAVENOUS ONCE
Refills: 0 | Status: COMPLETED | OUTPATIENT
Start: 2021-07-09 | End: 2021-07-09

## 2021-07-09 RX ORDER — ENOXAPARIN SODIUM 100 MG/ML
40 INJECTION SUBCUTANEOUS EVERY 24 HOURS
Refills: 0 | Status: DISCONTINUED | OUTPATIENT
Start: 2021-07-09 | End: 2021-07-11

## 2021-07-09 RX ORDER — ACETAMINOPHEN 500 MG
1000 TABLET ORAL ONCE
Refills: 0 | Status: COMPLETED | OUTPATIENT
Start: 2021-07-09 | End: 2021-07-09

## 2021-07-09 RX ORDER — SODIUM CHLORIDE 9 MG/ML
1000 INJECTION, SOLUTION INTRAVENOUS
Refills: 0 | Status: DISCONTINUED | OUTPATIENT
Start: 2021-07-09 | End: 2021-07-11

## 2021-07-09 RX ORDER — ELECTROLYTE SOLUTION,INJ
1 VIAL (ML) INTRAVENOUS
Refills: 0 | Status: DISCONTINUED | OUTPATIENT
Start: 2021-07-09 | End: 2021-07-09

## 2021-07-09 RX ORDER — POTASSIUM CHLORIDE 20 MEQ
10 PACKET (EA) ORAL
Refills: 0 | Status: COMPLETED | OUTPATIENT
Start: 2021-07-09 | End: 2021-07-09

## 2021-07-09 RX ADMIN — ONDANSETRON 4 MILLIGRAM(S): 8 TABLET, FILM COATED ORAL at 11:34

## 2021-07-09 RX ADMIN — Medication 100 MILLIEQUIVALENT(S): at 00:58

## 2021-07-09 RX ADMIN — PANTOPRAZOLE SODIUM 40 MILLIGRAM(S): 20 TABLET, DELAYED RELEASE ORAL at 21:30

## 2021-07-09 RX ADMIN — POTASSIUM PHOSPHATE, MONOBASIC POTASSIUM PHOSPHATE, DIBASIC 62.5 MILLIMOLE(S): 236; 224 INJECTION, SOLUTION INTRAVENOUS at 21:30

## 2021-07-09 RX ADMIN — ENOXAPARIN SODIUM 40 MILLIGRAM(S): 100 INJECTION SUBCUTANEOUS at 17:56

## 2021-07-09 RX ADMIN — Medication 100 GRAM(S): at 10:01

## 2021-07-09 RX ADMIN — PANTOPRAZOLE SODIUM 40 MILLIGRAM(S): 20 TABLET, DELAYED RELEASE ORAL at 11:31

## 2021-07-09 RX ADMIN — Medication 100 MILLIEQUIVALENT(S): at 18:53

## 2021-07-09 RX ADMIN — Medication 100 MILLIEQUIVALENT(S): at 11:31

## 2021-07-09 RX ADMIN — Medication 400 MILLIGRAM(S): at 07:43

## 2021-07-09 RX ADMIN — Medication 1 EACH: at 17:56

## 2021-07-09 RX ADMIN — SODIUM CHLORIDE 150 MILLILITER(S): 9 INJECTION, SOLUTION INTRAVENOUS at 22:01

## 2021-07-09 RX ADMIN — Medication 1000 MILLIGRAM(S): at 22:23

## 2021-07-09 RX ADMIN — Medication 100 MILLIEQUIVALENT(S): at 16:31

## 2021-07-09 RX ADMIN — ONDANSETRON 4 MILLIGRAM(S): 8 TABLET, FILM COATED ORAL at 03:38

## 2021-07-09 RX ADMIN — Medication 1000 MILLIGRAM(S): at 07:58

## 2021-07-09 RX ADMIN — Medication 1000 MILLIGRAM(S): at 21:53

## 2021-07-09 NOTE — CHART NOTE - NSCHARTNOTEFT_GEN_A_CORE
Patient went for EGD with dilation in the Endoscopy Suite with Dr. Quesada. Please refer to "Results" tab for completed report. 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    RECOMMENDATIONS  Clears as tolerated  Repeat upper GI series

## 2021-07-10 LAB
ANION GAP SERPL CALC-SCNC: 10 MMOL/L — SIGNIFICANT CHANGE UP (ref 5–17)
BUN SERPL-MCNC: 7 MG/DL — SIGNIFICANT CHANGE UP (ref 7–23)
CALCIUM SERPL-MCNC: 9.4 MG/DL — SIGNIFICANT CHANGE UP (ref 8.4–10.5)
CHLORIDE SERPL-SCNC: 102 MMOL/L — SIGNIFICANT CHANGE UP (ref 96–108)
CO2 SERPL-SCNC: 27 MMOL/L — SIGNIFICANT CHANGE UP (ref 22–31)
CREAT SERPL-MCNC: 0.47 MG/DL — LOW (ref 0.5–1.3)
GLUCOSE SERPL-MCNC: 182 MG/DL — HIGH (ref 70–99)
HCT VFR BLD CALC: 36.7 % — SIGNIFICANT CHANGE UP (ref 34.5–45)
HGB BLD-MCNC: 11.5 G/DL — SIGNIFICANT CHANGE UP (ref 11.5–15.5)
MAGNESIUM SERPL-MCNC: 1.5 MG/DL — LOW (ref 1.6–2.6)
MCHC RBC-ENTMCNC: 22.8 PG — LOW (ref 27–34)
MCHC RBC-ENTMCNC: 31.3 GM/DL — LOW (ref 32–36)
MCV RBC AUTO: 72.7 FL — LOW (ref 80–100)
NRBC # BLD: 0 /100 WBCS — SIGNIFICANT CHANGE UP (ref 0–0)
PHOSPHATE SERPL-MCNC: 1.4 MG/DL — LOW (ref 2.5–4.5)
PLATELET # BLD AUTO: 179 K/UL — SIGNIFICANT CHANGE UP (ref 150–400)
POTASSIUM SERPL-MCNC: 3.4 MMOL/L — LOW (ref 3.5–5.3)
POTASSIUM SERPL-SCNC: 3.4 MMOL/L — LOW (ref 3.5–5.3)
RBC # BLD: 5.05 M/UL — SIGNIFICANT CHANGE UP (ref 3.8–5.2)
RBC # FLD: 16.8 % — HIGH (ref 10.3–14.5)
SODIUM SERPL-SCNC: 139 MMOL/L — SIGNIFICANT CHANGE UP (ref 135–145)
WBC # BLD: 4.36 K/UL — SIGNIFICANT CHANGE UP (ref 3.8–10.5)
WBC # FLD AUTO: 4.36 K/UL — SIGNIFICANT CHANGE UP (ref 3.8–10.5)

## 2021-07-10 PROCEDURE — 99232 SBSQ HOSP IP/OBS MODERATE 35: CPT

## 2021-07-10 RX ORDER — LANOLIN ALCOHOL/MO/W.PET/CERES
5 CREAM (GRAM) TOPICAL AT BEDTIME
Refills: 0 | Status: DISCONTINUED | OUTPATIENT
Start: 2021-07-10 | End: 2021-07-11

## 2021-07-10 RX ORDER — POTASSIUM PHOSPHATE, MONOBASIC POTASSIUM PHOSPHATE, DIBASIC 236; 224 MG/ML; MG/ML
21 INJECTION, SOLUTION INTRAVENOUS ONCE
Refills: 0 | Status: COMPLETED | OUTPATIENT
Start: 2021-07-10 | End: 2021-07-10

## 2021-07-10 RX ORDER — ELECTROLYTE SOLUTION,INJ
1 VIAL (ML) INTRAVENOUS
Refills: 0 | Status: DISCONTINUED | OUTPATIENT
Start: 2021-07-10 | End: 2021-07-10

## 2021-07-10 RX ORDER — I.V. FAT EMULSION 20 G/100ML
0.52 EMULSION INTRAVENOUS
Qty: 50 | Refills: 0 | Status: DISCONTINUED | OUTPATIENT
Start: 2021-07-10 | End: 2021-07-11

## 2021-07-10 RX ORDER — POTASSIUM CHLORIDE 20 MEQ
40 PACKET (EA) ORAL ONCE
Refills: 0 | Status: COMPLETED | OUTPATIENT
Start: 2021-07-10 | End: 2021-07-10

## 2021-07-10 RX ORDER — MAGNESIUM SULFATE 500 MG/ML
2 VIAL (ML) INJECTION ONCE
Refills: 0 | Status: COMPLETED | OUTPATIENT
Start: 2021-07-10 | End: 2021-07-10

## 2021-07-10 RX ORDER — ACETAMINOPHEN 500 MG
1000 TABLET ORAL ONCE
Refills: 0 | Status: COMPLETED | OUTPATIENT
Start: 2021-07-10 | End: 2021-07-10

## 2021-07-10 RX ADMIN — PANTOPRAZOLE SODIUM 40 MILLIGRAM(S): 20 TABLET, DELAYED RELEASE ORAL at 12:08

## 2021-07-10 RX ADMIN — SODIUM CHLORIDE 150 MILLILITER(S): 9 INJECTION, SOLUTION INTRAVENOUS at 18:58

## 2021-07-10 RX ADMIN — Medication 400 MILLIGRAM(S): at 19:56

## 2021-07-10 RX ADMIN — ENOXAPARIN SODIUM 40 MILLIGRAM(S): 100 INJECTION SUBCUTANEOUS at 17:45

## 2021-07-10 RX ADMIN — Medication 40 MILLIEQUIVALENT(S): at 18:58

## 2021-07-10 RX ADMIN — POTASSIUM PHOSPHATE, MONOBASIC POTASSIUM PHOSPHATE, DIBASIC 62.5 MILLIMOLE(S): 236; 224 INJECTION, SOLUTION INTRAVENOUS at 19:56

## 2021-07-10 RX ADMIN — Medication 50 GRAM(S): at 18:56

## 2021-07-10 RX ADMIN — Medication 1 EACH: at 17:45

## 2021-07-10 RX ADMIN — ONDANSETRON 4 MILLIGRAM(S): 8 TABLET, FILM COATED ORAL at 18:58

## 2021-07-11 ENCOUNTER — TRANSCRIPTION ENCOUNTER (OUTPATIENT)
Age: 61
End: 2021-07-11

## 2021-07-11 VITALS
TEMPERATURE: 98 F | DIASTOLIC BLOOD PRESSURE: 88 MMHG | HEART RATE: 107 BPM | SYSTOLIC BLOOD PRESSURE: 144 MMHG | RESPIRATION RATE: 16 BRPM | OXYGEN SATURATION: 96 %

## 2021-07-11 LAB
ANION GAP SERPL CALC-SCNC: 11 MMOL/L — SIGNIFICANT CHANGE UP (ref 5–17)
BUN SERPL-MCNC: 7 MG/DL — SIGNIFICANT CHANGE UP (ref 7–23)
CALCIUM SERPL-MCNC: 8.6 MG/DL — SIGNIFICANT CHANGE UP (ref 8.4–10.5)
CHLORIDE SERPL-SCNC: 104 MMOL/L — SIGNIFICANT CHANGE UP (ref 96–108)
CO2 SERPL-SCNC: 25 MMOL/L — SIGNIFICANT CHANGE UP (ref 22–31)
CREAT SERPL-MCNC: 0.53 MG/DL — SIGNIFICANT CHANGE UP (ref 0.5–1.3)
GLUCOSE SERPL-MCNC: 119 MG/DL — HIGH (ref 70–99)
HCT VFR BLD CALC: 32.2 % — LOW (ref 34.5–45)
HGB BLD-MCNC: 10.1 G/DL — LOW (ref 11.5–15.5)
MAGNESIUM SERPL-MCNC: 1.8 MG/DL — SIGNIFICANT CHANGE UP (ref 1.6–2.6)
MCHC RBC-ENTMCNC: 22.5 PG — LOW (ref 27–34)
MCHC RBC-ENTMCNC: 31.4 GM/DL — LOW (ref 32–36)
MCV RBC AUTO: 71.7 FL — LOW (ref 80–100)
NRBC # BLD: 0 /100 WBCS — SIGNIFICANT CHANGE UP (ref 0–0)
PHOSPHATE SERPL-MCNC: 2.6 MG/DL — SIGNIFICANT CHANGE UP (ref 2.5–4.5)
PLATELET # BLD AUTO: 181 K/UL — SIGNIFICANT CHANGE UP (ref 150–400)
POTASSIUM SERPL-MCNC: 3.4 MMOL/L — LOW (ref 3.5–5.3)
POTASSIUM SERPL-SCNC: 3.4 MMOL/L — LOW (ref 3.5–5.3)
RBC # BLD: 4.49 M/UL — SIGNIFICANT CHANGE UP (ref 3.8–5.2)
RBC # FLD: 16.7 % — HIGH (ref 10.3–14.5)
SODIUM SERPL-SCNC: 140 MMOL/L — SIGNIFICANT CHANGE UP (ref 135–145)
WBC # BLD: 5.76 K/UL — SIGNIFICANT CHANGE UP (ref 3.8–10.5)
WBC # FLD AUTO: 5.76 K/UL — SIGNIFICANT CHANGE UP (ref 3.8–10.5)

## 2021-07-11 PROCEDURE — 99285 EMERGENCY DEPT VISIT HI MDM: CPT

## 2021-07-11 PROCEDURE — 86850 RBC ANTIBODY SCREEN: CPT

## 2021-07-11 PROCEDURE — 85610 PROTHROMBIN TIME: CPT

## 2021-07-11 PROCEDURE — 85025 COMPLETE CBC W/AUTO DIFF WBC: CPT

## 2021-07-11 PROCEDURE — 86769 SARS-COV-2 COVID-19 ANTIBODY: CPT

## 2021-07-11 PROCEDURE — 36415 COLL VENOUS BLD VENIPUNCTURE: CPT

## 2021-07-11 PROCEDURE — 80048 BASIC METABOLIC PNL TOTAL CA: CPT

## 2021-07-11 PROCEDURE — 83690 ASSAY OF LIPASE: CPT

## 2021-07-11 PROCEDURE — 85027 COMPLETE CBC AUTOMATED: CPT

## 2021-07-11 PROCEDURE — 93005 ELECTROCARDIOGRAM TRACING: CPT

## 2021-07-11 PROCEDURE — 86901 BLOOD TYPING SEROLOGIC RH(D): CPT

## 2021-07-11 PROCEDURE — 87635 SARS-COV-2 COVID-19 AMP PRB: CPT

## 2021-07-11 PROCEDURE — 96374 THER/PROPH/DIAG INJ IV PUSH: CPT

## 2021-07-11 PROCEDURE — 83735 ASSAY OF MAGNESIUM: CPT

## 2021-07-11 PROCEDURE — 81001 URINALYSIS AUTO W/SCOPE: CPT

## 2021-07-11 PROCEDURE — 85730 THROMBOPLASTIN TIME PARTIAL: CPT

## 2021-07-11 PROCEDURE — 83605 ASSAY OF LACTIC ACID: CPT

## 2021-07-11 PROCEDURE — C1726: CPT

## 2021-07-11 PROCEDURE — 84100 ASSAY OF PHOSPHORUS: CPT

## 2021-07-11 PROCEDURE — 96375 TX/PRO/DX INJ NEW DRUG ADDON: CPT

## 2021-07-11 PROCEDURE — 86900 BLOOD TYPING SEROLOGIC ABO: CPT

## 2021-07-11 PROCEDURE — 71045 X-RAY EXAM CHEST 1 VIEW: CPT

## 2021-07-11 PROCEDURE — 80053 COMPREHEN METABOLIC PANEL: CPT

## 2021-07-11 RX ORDER — ACETAMINOPHEN 500 MG
1000 TABLET ORAL ONCE
Refills: 0 | Status: COMPLETED | OUTPATIENT
Start: 2021-07-11 | End: 2021-07-11

## 2021-07-11 RX ORDER — HYOSCYAMINE SULFATE 0.13 MG
1 TABLET ORAL
Qty: 56 | Refills: 0
Start: 2021-07-11 | End: 2021-07-24

## 2021-07-11 RX ORDER — AMLODIPINE BESYLATE 2.5 MG/1
0 TABLET ORAL
Qty: 0 | Refills: 0 | DISCHARGE

## 2021-07-11 RX ORDER — EZETIMIBE 10 MG/1
1 TABLET ORAL
Qty: 0 | Refills: 0 | DISCHARGE

## 2021-07-11 RX ORDER — ALBUTEROL 90 UG/1
3 AEROSOL, METERED ORAL
Qty: 0 | Refills: 0 | DISCHARGE

## 2021-07-11 RX ORDER — AMLODIPINE BESYLATE 2.5 MG/1
5 TABLET ORAL
Qty: 14 | Refills: 0
Start: 2021-07-11 | End: 2021-07-24

## 2021-07-11 RX ORDER — POTASSIUM CHLORIDE 20 MEQ
20 PACKET (EA) ORAL
Refills: 0 | Status: DISCONTINUED | OUTPATIENT
Start: 2021-07-11 | End: 2021-07-11

## 2021-07-11 RX ADMIN — ENOXAPARIN SODIUM 40 MILLIGRAM(S): 100 INJECTION SUBCUTANEOUS at 17:14

## 2021-07-11 RX ADMIN — ONDANSETRON 4 MILLIGRAM(S): 8 TABLET, FILM COATED ORAL at 17:19

## 2021-07-11 RX ADMIN — Medication 1000 MILLIGRAM(S): at 14:37

## 2021-07-11 RX ADMIN — Medication 1000 MILLIGRAM(S): at 07:47

## 2021-07-11 RX ADMIN — I.V. FAT EMULSION 20.8 GM/KG/DAY: 20 EMULSION INTRAVENOUS at 00:08

## 2021-07-11 RX ADMIN — PANTOPRAZOLE SODIUM 40 MILLIGRAM(S): 20 TABLET, DELAYED RELEASE ORAL at 11:57

## 2021-07-11 RX ADMIN — Medication 400 MILLIGRAM(S): at 14:22

## 2021-07-11 RX ADMIN — Medication 400 MILLIGRAM(S): at 03:32

## 2021-07-11 RX ADMIN — ONDANSETRON 4 MILLIGRAM(S): 8 TABLET, FILM COATED ORAL at 03:35

## 2021-07-11 NOTE — DISCHARGE NOTE PROVIDER - CARE PROVIDER_API CALL
07/16/2020  Pedor Melgar is a 54 y.o., male.    Anesthesia Evaluation    I have reviewed the Patient Summary Reports.    I have reviewed the Nursing Notes.    I have reviewed the Medications.     Review of Systems  Anesthesia Hx:  No problems with previous Anesthesia    Social:  Smoker, Social Alcohol Use    Hematology/Oncology:  Hematology Normal   Oncology Normal     EENT/Dental:EENT/Dental Normal   Cardiovascular:   Exercise tolerance: good CAD      Pulmonary:  Pulmonary Normal    Renal/:  Renal/ Normal     Hepatic/GI:  Hepatic/GI Normal    Musculoskeletal:  Musculoskeletal Normal    Neurological:  Neurology Normal    Endocrine:  Endocrine Normal    Dermatological:  Skin Normal    Psych:  Psychiatric Normal           Physical Exam  General:  Well nourished    Airway/Jaw/Neck:  Airway Findings: Mouth Opening: Normal Tongue: Normal  General Airway Assessment: Adult, Good  Mallampati: I  TM Distance: Normal, at least 6 cm  Jaw/Neck Findings:  Neck ROM: Normal ROM      Dental:  Dental Findings: In tact        Mental Status:  Mental Status Findings:  Cooperative, Alert and Oriented         Anesthesia Plan  Type of Anesthesia, risks & benefits discussed:  Anesthesia Type:  general  Patient's Preference:   Intra-op Monitoring Plan: standard ASA monitors  Intra-op Monitoring Plan Comments:   Post Op Pain Control Plan: per primary service following discharge from PACU and multimodal analgesia  Post Op Pain Control Plan Comments:   Induction:    Beta Blocker:         Informed Consent: Patient understands risks and agrees with Anesthesia plan.  Questions answered. Anesthesia consent signed with patient.  ASA Score: 1     Day of Surgery Review of History & Physical:    H&P update referred to the surgeon.     Anesthesia Plan Notes: Propofol for sedation.        Ready For Surgery From Anesthesia Perspective. 
Cam Salcido (MD)  Surgery  [address] [place] [address]  Phone: [phone]  Fax: [phone]  Follow Up Time:

## 2021-07-11 NOTE — DISCHARGE NOTE PROVIDER - HOSPITAL COURSE
Patient is a 61 year old female with a history of asthma, HTN, hyperlipidemia, GERD, borderline DM, and morbid obesity s/p gastric sleeve gastrectomy by Dr. Salcido 5/11/21 who presents via walk-in for abdominal pain. Patient's post-operative course was uncomplicated and she was discharged on liquid diet. Patient re-admitted 5/17 for PO intolerance and abdominal discomfort; CT abd/pelvis at that time showed mild post-surgical changes. Patient re-presented to ED on 6/24/21 with similar complaints; patient admitted for further managemnt and monitoring; diet advanced and patient discharged tolerating liquids following unremarkable CT abd/pelvis. Patient was seen by Dr. Salcido in outpatient clinic on 7/07 with advisement that recurrent symptoms will prompt UGI endoscopy with possible dilation with Dr. Quesada.  She now presnts on 7/8 with  intermittent left-sided abdominal discomfort characterized as "tightness" that occasionally radiates to her back, is aggravated with PO intake and mildly alleviated with ambulation. She further endorses associated constipation (last BM 10 days ago), nausea and repeated "gagging" although denies any emesis. She currently notes limited PO intake; has consumed 1/2L water in the past 24hours. Patient denies any fevers, chills, CP, or SOB. Of note, patient has lost ~30lbs since her surgery. Hospital course as follows:    7/10: pt tolerating diet, -n/-v, drinking the smal cups very slowly  O/N: c/o acid reflux, CIELO  7/9: EGD today- GI spoke with Dr. Salcido; stricture at incisura, dilated to 30mm with balloon under fluoro; clear liquid diet per GI, obtain repeat UGI series if warranted.    O/N: 18:00 K+ 3.2, repleted, low urine o/p, bs >400 pt voided 100cc and then again  7/8: GI consult placed for Dr. Quesada for recs regarding upper GI endoscopy with possible dilation. EGD may be performed tomorrow. Electrolytes aggresively repleted; repeat bmp labs at 18:00 7/8.     On discharge day, patient pain was well controlled. she was tolerating diet without nausea or vomiting; passing gas and having BM and ambulating without difficulties  Patient was stable and deemed appropriate for discharge. She will follow in clinic.

## 2021-07-11 NOTE — PROGRESS NOTE ADULT - SUBJECTIVE AND OBJECTIVE BOX
GASTROENTEROLOGY PROGRESS NOTE  Patient seen and examined at bedside. Tolerating clear liquids w/o n/v. Still some LUQ tenderness.     PERTINENT REVIEW OF SYSTEMS:  CONSTITUTIONAL: No weakness, fevers or chills  HEENT: No visual changes; No vertigo or throat pain   GASTROINTESTINAL: As above.  NEUROLOGICAL: No numbness or weakness  SKIN: No itching, burning, rashes, or lesions     Allergies    Bananas (Rash)  bee sting loss of consciousness,skin burn (Other)  latex (Hives; Pruritus)  levofloxacin (Hives)  penicillins (Short breath)  pine tree (Hives; Rash)  quinolines (Short breath)  Seafood (Short breath)  sulfa drugs (Short breath)    Intolerances      MEDICATIONS:  MEDICATIONS  (STANDING):  enoxaparin Injectable 40 milliGRAM(s) SubCutaneous every 24 hours  fat emulsion (Fish Oil and Plant Based) 20% Infusion 0.5176 Gm/kG/Day (20.8 mL/Hr) IV Continuous <Continuous>  lactated ringers. 1000 milliLiter(s) (150 mL/Hr) IV Continuous <Continuous>  pantoprazole  Injectable 40 milliGRAM(s) IV Push daily  Parenteral Nutrition - Adult 1 Each (73 mL/Hr) TPN Continuous <Continuous>  Parenteral Nutrition - Adult 1 Each (63 mL/Hr) TPN Continuous <Continuous>    MEDICATIONS  (PRN):  ondansetron Injectable 4 milliGRAM(s) IV Push every 6 hours PRN Nausea and/or Vomiting    Vital Signs Last 24 Hrs  T(C): 36.5 (10 Jul 2021 16:52), Max: 36.9 (09 Jul 2021 20:30)  T(F): 97.7 (10 Jul 2021 16:52), Max: 98.5 (09 Jul 2021 20:30)  HR: 78 (10 Jul 2021 16:52) (72 - 82)  BP: 165/90 (10 Jul 2021 16:52) (147/83 - 165/90)  BP(mean): 115 (10 Jul 2021 16:52) (110 - 115)  RR: 18 (10 Jul 2021 16:52) (17 - 18)  SpO2: 99% (10 Jul 2021 16:52) (97% - 100%)    07-09 @ 07:01  -  07-10 @ 07:00  --------------------------------------------------------  IN: 4143 mL / OUT: 3000 mL / NET: 1143 mL    07-10 @ 07:01  -  07-10 @ 17:24  --------------------------------------------------------  IN: 200 mL / OUT: 350 mL / NET: -150 mL      PHYSICAL EXAM:    General: in no acute distress  HEENT: MMM, conjunctiva and sclera clear  Gastrointestinal: Soft mildly tender LUQ; non-distended; No rebound or guarding  Skin: Warm and dry. No obvious rash    LABS:                        11.5   4.36  )-----------( 179      ( 10 Jul 2021 07:07 )             36.7     07-10    139  |  102  |  7   ----------------------------<  182<H>  3.4<L>   |  27  |  0.47<L>    Ca    9.4      10 Jul 2021 07:07  Phos  1.4     07-10  Mg     1.5     07-10                        RADIOLOGY & ADDITIONAL STUDIES:  Reviewed
INTERVAL HPI/OVERNIGHT EVENTS: O/N: placed an IV, attempting to advance    STATUS POST:  s/p gastric sleeve gastrectomy  5/11/21, upper endoscopy with balloon dilation to 30mm of gastric incisura with Dr. Quesada 7/9.     POST OPERATIVE DAY #: 2    SUBJECTIVE:  Patient was seen and evaluated at the bedside. Patient says that her abdominal pain has improved. She states that she has been advancing her diet as tolerated; she notes having consumed 12oz of water PO in 3 hours yesterday as well as broth last night without any post-prandial abdominal discomfort.     enoxaparin Injectable 40 milliGRAM(s) SubCutaneous every 24 hours      Vital Signs Last 24 Hrs  T(C): 36.8 (11 Jul 2021 16:35), Max: 36.8 (11 Jul 2021 16:35)  T(F): 98.3 (11 Jul 2021 16:35), Max: 98.3 (11 Jul 2021 16:35)  HR: 90 (11 Jul 2021 16:35) (70 - 90)  BP: 160/84 (11 Jul 2021 16:35) (128/84 - 160/84)  BP(mean): 105 (11 Jul 2021 05:26) (105 - 105)  RR: 17 (11 Jul 2021 16:35) (17 - 18)  SpO2: 98% (11 Jul 2021 16:35) (98% - 100%)  I&O's Detail    10 Jul 2021 07:01  -  11 Jul 2021 07:00  --------------------------------------------------------  IN:    Fat Emulsion (Fish Oil &amp; Plant Based) 20% Infusion: 145.6 mL    Lactated Ringers: 3000 mL    Oral Fluid: 200 mL    PPN (Peripheral Parenteral Nutrition): 1449 mL  Total IN: 4794.6 mL    OUT:    Voided (mL): 1551 mL  Total OUT: 1551 mL    Total NET: 3243.6 mL      11 Jul 2021 07:01  -  11 Jul 2021 19:58  --------------------------------------------------------  IN:    Lactated Ringers: 1800 mL    Oral Fluid: 710 mL  Total IN: 2510 mL    OUT:    Voided (mL): 1000 mL  Total OUT: 1000 mL    Total NET: 1510 mL          General: NAD, resting comfortably in bed  C/V: Normal rate  Pulm: Nonlabored breathing, no respiratory distress. Patient speaking in complete sentences.   Abd: soft, mildly distended abdomen; abdomen non-TTP; well healed surgical incision.   Extrem: WWP, no edema      LABS:                        10.1   5.76  )-----------( 181      ( 11 Jul 2021 07:43 )             32.2     07-11    140  |  104  |  7   ----------------------------<  119<H>  3.4<L>   |  25  |  0.53    Ca    8.6      11 Jul 2021 07:43  Phos  2.6     07-11  Mg     1.8     07-11          
INTERVAL HPI: patient is presenting for limited PO intake  s/p gastric sleeve gastrectomy  5/11/21    SUBJECTIVE: Pt seen and examined at bedside this am by surgery team. Denies flatus/BM. Pain well controlled. Denies f/n/v/cp/sob.    MEDICATIONS  (STANDING):  enoxaparin Injectable 40 milliGRAM(s) SubCutaneous every 24 hours  fat emulsion (Fish Oil and Plant Based) 20% Infusion 0.5176 Gm/kG/Day (20.8 mL/Hr) IV Continuous <Continuous>  lactated ringers. 1000 milliLiter(s) (150 mL/Hr) IV Continuous <Continuous>  pantoprazole  Injectable 40 milliGRAM(s) IV Push daily  Parenteral Nutrition - Adult 1 Each (73 mL/Hr) TPN Continuous <Continuous>    MEDICATIONS  (PRN):  melatonin 5 milliGRAM(s) Oral at bedtime PRN Insomnia  ondansetron Injectable 4 milliGRAM(s) IV Push every 6 hours PRN Nausea and/or Vomiting      Vital Signs Last 24 Hrs  T(C): 36.6 (10 Jul 2021 20:50), Max: 36.8 (10 Jul 2021 05:02)  T(F): 97.9 (10 Jul 2021 20:50), Max: 98.3 (10 Jul 2021 05:02)  HR: 75 (10 Jul 2021 20:50) (72 - 78)  BP: 129/79 (10 Jul 2021 20:50) (129/79 - 165/90)  BP(mean): 115 (10 Jul 2021 16:52) (115 - 115)  RR: 18 (10 Jul 2021 20:50) (17 - 18)  SpO2: 99% (10 Jul 2021 20:50) (99% - 100%)    PHYSICAL EXAM:      Respiratory: non labored breathing, no respiratory distress    Cardiovascular: NSR, RRR    Gastrointestinal: soft, non-tender, non-distended    Extremities: (-) edema              I&O's Detail    09 Jul 2021 07:01  -  10 Jul 2021 07:00  --------------------------------------------------------  IN:    Lactated Ringers: 3450 mL    PPN (Peripheral Parenteral Nutrition): 693 mL  Total IN: 4143 mL    OUT:    Oral Fluid: 0 mL    Voided (mL): 3000 mL  Total OUT: 3000 mL    Total NET: 1143 mL      10 Jul 2021 07:01  -  11 Jul 2021 02:52  --------------------------------------------------------  IN:    Lactated Ringers: 1800 mL    Oral Fluid: 200 mL    PPN (Peripheral Parenteral Nutrition): 819 mL  Total IN: 2819 mL    OUT:    Voided (mL): 1300 mL  Total OUT: 1300 mL    Total NET: 1519 mL          LABS:                        11.5   4.36  )-----------( 179      ( 10 Jul 2021 07:07 )             36.7     07-10    139  |  102  |  7   ----------------------------<  182<H>  3.4<L>   |  27  |  0.47<L>    Ca    9.4      10 Jul 2021 07:07  Phos  1.4     07-10  Mg     1.5     07-10            RADIOLOGY & ADDITIONAL STUDIES:
INTERVAL HPI/OVERNIGHT EVENTS: O/N: 18:00 K+ 3.2, repleted, low urine o/p, bs >400 pt voided 100cc and then again  Progress Note: Surgery    SUBJECTIVE:  Patient is a 61 year old female with a history of asthma, HTN, hyperlipidemia, GERD, borderline DM, and morbid obesity s/p gastric sleeve gastrectomy by Dr. Salcido 5/11/21 who presents via walk-in for abdominal pain. Patient says that she continues to experience mild LUQ abdominal discomfort. She denies any significant nausea.         Vital Signs Last 24 Hrs  T(C): 36.8 (09 Jul 2021 05:00), Max: 36.8 (08 Jul 2021 12:34)  T(F): 98.3 (09 Jul 2021 05:00), Max: 98.3 (08 Jul 2021 14:06)  HR: 75 (09 Jul 2021 05:00) (65 - 78)  BP: 139/77 (09 Jul 2021 05:00) (121/72 - 139/77)  BP(mean): 98 (09 Jul 2021 05:00) (89 - 98)  RR: 18 (09 Jul 2021 05:00) (18 - 18)  SpO2: 98% (09 Jul 2021 05:00) (96% - 100%)  I&O's Detail    08 Jul 2021 07:01  -  09 Jul 2021 07:00  --------------------------------------------------------  IN:    IV PiggyBack: 400 mL    Lactated Ringers: 1920 mL  Total IN: 2320 mL    OUT:    Intermittent Catheterization - Urethral (mL): 75 mL    Voided (mL): 400 mL  Total OUT: 475 mL    Total NET: 1845 mL          General: NAD, resting comfortably in bed  C/V: Normal rate  Pulm: Nonlabored breathing, no respiratory distress. Patient speaking in complete sentences.   Abd: soft, mildly distended abdomen; mild TTP LUQ; well healed surgical incision.   Extrem: WWP, no edema, SCDs in place        LABS:                        10.2   5.08  )-----------( 172      ( 09 Jul 2021 06:46 )             33.6     07-09    141  |  102  |  7   ----------------------------<  71  3.3<L>   |  24  |  0.53    Ca    8.7      09 Jul 2021 06:48  Phos  2.5     07-09  Mg     1.8     07-09    TPro  7.3  /  Alb  3.8  /  TBili  1.3<H>  /  DBili  x   /  AST  46<H>  /  ALT  65<H>  /  AlkPhos  81  07-08    PT/INR - ( 08 Jul 2021 07:55 )   PT: 14.6 sec;   INR: 1.23          PTT - ( 08 Jul 2021 07:55 )  PTT:28.6 sec  Urinalysis Basic - ( 08 Jul 2021 08:12 )    Color: Gaye / Appearance: Clear / SG: >=1.030 / pH: x  Gluc: x / Ketone: >=80 mg/dL  / Bili: Large / Urobili: 4.0 E.U./dL   Blood: x / Protein: 100 mg/dL / Nitrite: NEGATIVE   Leuk Esterase: NEGATIVE / RBC: < 5 /HPF / WBC 5-10 /HPF   Sq Epi: x / Non Sq Epi: Moderate /HPF / Bacteria: Present /HPF

## 2021-07-11 NOTE — DISCHARGE NOTE NURSING/CASE MANAGEMENT/SOCIAL WORK - PATIENT PORTAL LINK FT
You can access the FollowMyHealth Patient Portal offered by [facility] by registering at the following website: [email or web address]. By joining [employer]’s FollowMyHealth portal, you will also be able to view your health information using other applications (apps) compatible with our system.

## 2021-07-11 NOTE — PROGRESS NOTE ADULT - ASSESSMENT
61yF w/Hx of asthma, HTN, HLD, GERD, preDM, obesity s/p gastric sleeve on 5/11 presenting with abdominal pain and PO intolerance. GI consulted for endoscopic evaluation.    1. Abdominal pain - Patient with LUQ pain and poor PO tolerance, reportedly since operation with ~30lbs of weight loss since May. Recent upper GI series with narrowing of gastric body, not causing obstruction on imaging, but patient reports inability to tolerate any PO intake (liquid or solid).   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 Quesada; office will call patient to schedule    Thank you for allowing us to participate in the care of this patient.  GI will sign off. Please call back with any questions or concerns.     Marta Gamble MD  PGY-5, Gastroenterology Fellow  pager: [phone]
Patient is a 61 year old female with a history of asthma, HTN, hyperlipidemia, GERD, borderline DM, and morbid obesity s/p gastric sleeve gastrectomy by Dr. Salcido 5/11/21 who presents via walk-in for abdominal pain and PO intolerance.     - BCLD  - Home medication re-instatement  - Thiamine, B12 provided  - VTE prophylaxis: SCD's  
Patient is a 61 year old female with a history of asthma, HTN, hyperlipidemia, GERD, borderline DM, and morbid obesity s/p gastric sleeve gastrectomy by Dr. Salcido 5/11/21 who presents via walk-in for abdominal pain and PO intolerance. pper endoscopy performed with Dr. Quesada 7/9 with balloon dilation of gastric stricture, patient now tolerating PO.      - Patient is clear for discharge  - Advised patient to advance diet as tolerated.   - Home medication re-instatement    
Patient is a 61 year old female with a history of asthma, HTN, hyperlipidemia, GERD, borderline DM, and morbid obesity s/p gastric sleeve gastrectomy by Dr. Salcido 5/11/21 who presents via walk-in for abdominal pain and PO intolerance.     - Case discussed with Dr. Salcido and chief resident  - GI consult for UGI endoscopy w/ possible dilation with Dr. Quesada 7/9  - Regular bariatric diet; NPO 7/9.   - Home medication re-instatement  - Thiamine, B12 provided  - VTE prophylaxis: SCD's

## 2021-07-11 NOTE — DISCHARGE NOTE PROVIDER - NSDCMRMEDTOKEN_GEN_ALL_CORE_FT
acetaminophen 325 mg oral tablet: 2 tab(s) orally every 6 hours, As needed, Moderate Pain (4 - 6) MDD:4000mg  Eliquis 2.5 mg oral tablet: 1 tab(s) orally 2 times a day MDD:2  omeprazole 40 mg oral delayed release capsule: 1 cap(s) orally once a day MDD:1   acetaminophen 325 mg oral tablet: 2 tab(s) orally every 6 hours, As needed, Moderate Pain (4 - 6) MDD:4000mg  Eliquis 2.5 mg oral tablet: 1 tab(s) orally 2 times a day MDD:2  hyoscyamine 0.125 mg sublingual tablet: 1 tab(s) sublingually every 6 hours, As Needed for pain   omeprazole 40 mg oral delayed release capsule: 1 cap(s) orally once a day MDD:1   acetaminophen 325 mg oral tablet: 2 tab(s) orally every 6 hours, As needed, Moderate Pain (4 - 6) MDD:4000mg  amLODIPine: 5 milligram(s) orally once a day   hyoscyamine 0.125 mg sublingual tablet: 1 tab(s) sublingually every 6 hours, As Needed for pain   omeprazole 40 mg oral delayed release capsule: 1 cap(s) orally once a day MDD:1  vitaminD2: 125 milligram(s) orally once a day

## 2021-07-11 NOTE — DISCHARGE NOTE PROVIDER - NSDCFUADDINST_GEN_ALL_CORE_FT
Diet: resume prehospital diet    Lifting: no lifting more than a gallon of milk, about 10 pounds for 6 weeks from your prior surgery.     Driving: ok to drive, do not drive while taking narcotic medications or alcohol     Precautions: Call your healthcare provider immediately if you have any of the following: Yellowing of your eyes or skin (jaundice), Fever of 100.4°F (38.0°C) or higher, or as directed by your healthcare provider, Redness, swelling, increasing pain, pus, or a foul smell at the incision site, Dark or rust-colored urine, Stool that is christo-colored or light in color instead of brown, Increasing belly pain, Rectal bleeding, Leg swelling or shortness of breath   Diet: resume prehospital diet    Lifting: no lifting more than a gallon of milk, about 10 pounds for 6 weeks from your prior surgery.     Driving: ok to drive, do not drive while taking narcotic medications or alcohol     Precautions: Call your healthcare provider immediately if you have any of the following: Yellowing of your eyes or skin (jaundice), Fever of 100.4°F (38.0°C) or higher, or as directed by your healthcare provider, Redness, swelling, increasing pain, pus, or a foul smell at the incision site, Dark or rust-colored urine, Stool that is christo-colored or light in color instead of brown, Increasing belly pain, Rectal bleeding, Leg swelling or shortness of breath    Follow up with your primary care provider within 1-2 weeks in order to re-instate your home medications (amlodipine, Vit D, hyoscyamine- 14 days supply provided)

## 2021-07-11 NOTE — DISCHARGE NOTE PROVIDER - NSDCCPCAREPLAN_GEN_ALL_CORE_FT
PRINCIPAL DISCHARGE DIAGNOSIS  Diagnosis: Abdominal pain  Assessment and Plan of Treatment:       SECONDARY DISCHARGE DIAGNOSES  Diagnosis: Food intolerance  Assessment and Plan of Treatment:

## 2021-07-14 ENCOUNTER — TRANSCRIPTION ENCOUNTER (OUTPATIENT)
Age: 61
End: 2021-07-14

## 2021-07-21 DIAGNOSIS — E78.5 HYPERLIPIDEMIA, UNSPECIFIED: ICD-10-CM

## 2021-07-21 DIAGNOSIS — E66.01 MORBID (SEVERE) OBESITY DUE TO EXCESS CALORIES: ICD-10-CM

## 2021-07-21 DIAGNOSIS — K31.89 OTHER DISEASES OF STOMACH AND DUODENUM: ICD-10-CM

## 2021-07-21 DIAGNOSIS — Z98.84 BARIATRIC SURGERY STATUS: ICD-10-CM

## 2021-07-21 DIAGNOSIS — R10.9 UNSPECIFIED ABDOMINAL PAIN: ICD-10-CM

## 2021-07-21 DIAGNOSIS — I10 ESSENTIAL (PRIMARY) HYPERTENSION: ICD-10-CM

## 2021-07-21 DIAGNOSIS — J45.909 UNSPECIFIED ASTHMA, UNCOMPLICATED: ICD-10-CM

## 2021-07-21 DIAGNOSIS — M32.9 SYSTEMIC LUPUS ERYTHEMATOSUS, UNSPECIFIED: ICD-10-CM

## 2021-07-21 DIAGNOSIS — K21.9 GASTRO-ESOPHAGEAL REFLUX DISEASE WITHOUT ESOPHAGITIS: ICD-10-CM

## 2021-07-21 DIAGNOSIS — K59.00 CONSTIPATION, UNSPECIFIED: ICD-10-CM

## 2021-07-21 DIAGNOSIS — E11.9 TYPE 2 DIABETES MELLITUS WITHOUT COMPLICATIONS: ICD-10-CM

## 2021-07-21 DIAGNOSIS — Z79.01 LONG TERM (CURRENT) USE OF ANTICOAGULANTS: ICD-10-CM

## 2021-08-02 ENCOUNTER — APPOINTMENT (OUTPATIENT)
Dept: BARIATRICS | Facility: CLINIC | Age: 61
End: 2021-08-02

## 2021-08-04 ENCOUNTER — APPOINTMENT (OUTPATIENT)
Dept: BARIATRICS | Facility: CLINIC | Age: 61
End: 2021-08-04
Payer: COMMERCIAL

## 2021-08-04 VITALS — BODY MASS INDEX: 31.18 KG/M2 | WEIGHT: 210.5 LBS | HEIGHT: 69 IN

## 2021-08-04 PROCEDURE — 99024 POSTOP FOLLOW-UP VISIT: CPT

## 2021-08-04 NOTE — ASSESSMENT
[FreeTextEntry1] : Patient continues to have symptoms so will speak to Dr. Quesada about re-scope, check blood work and have patient speak to our dietician.

## 2021-08-04 NOTE — END OF VISIT
[FreeTextEntry3] : All medical record entries made by the Scribe were at my, Dr. Salcido's, discretion and personally dictated by me on 08/04/2021. I have reviewed the chart and agree that the record accurately reflects my personal performance of the history, physical exam, assessment and plan. I have also personally directed, reviewed and agreed to the chart.

## 2021-08-04 NOTE — HISTORY OF PRESENT ILLNESS
[de-identified] : Maddi Solorzano is a 62 y/o F who returns to see me via phone consult almost 3 months s/p lap sleeve gastrectomy 5/11/21. She had an EGD, which showed some angulation, and a dilation with Dr. Hernan Quesada. Now tolerating scrambled eggs. No further emesis. Still complaining of some symptoms. As a result, think it's best to re-scope and check labs. Will meet with dietician. Also gave some dietary recommendations such as chili, chopped liver, yogurt. etc. \par

## 2021-08-04 NOTE — ADDENDUM
[FreeTextEntry1] : This note was written by Ethel Dillard on 08/04/2021 acting as scribe for Dr. Salcido.

## 2021-08-10 ENCOUNTER — APPOINTMENT (OUTPATIENT)
Dept: BARIATRICS | Facility: CLINIC | Age: 61
End: 2021-08-10
Payer: COMMERCIAL

## 2021-08-10 DIAGNOSIS — E66.01 MORBID (SEVERE) OBESITY DUE TO EXCESS CALORIES: ICD-10-CM

## 2021-08-10 DIAGNOSIS — Z98.84 BARIATRIC SURGERY STATUS: ICD-10-CM

## 2021-08-10 PROCEDURE — 97803 MED NUTRITION INDIV SUBSEQ: CPT | Mod: 95

## 2021-09-16 ENCOUNTER — APPOINTMENT (OUTPATIENT)
Dept: GASTROENTEROLOGY | Facility: CLINIC | Age: 61
End: 2021-09-16
Payer: COMMERCIAL

## 2021-09-16 VITALS
SYSTOLIC BLOOD PRESSURE: 111 MMHG | DIASTOLIC BLOOD PRESSURE: 60 MMHG | BODY MASS INDEX: 31.83 KG/M2 | HEART RATE: 74 BPM | RESPIRATION RATE: 16 BRPM | TEMPERATURE: 98 F | HEIGHT: 68 IN | WEIGHT: 210 LBS | OXYGEN SATURATION: 99 %

## 2021-09-16 DIAGNOSIS — K31.89 OTHER DISEASES OF STOMACH AND DUODENUM: ICD-10-CM

## 2021-09-16 PROCEDURE — 99213 OFFICE O/P EST LOW 20 MIN: CPT

## 2021-09-17 NOTE — ED PROVIDER NOTE - NS ED MD DISPO SPECIAL CONSIDERATION1
1428: Patient arrived from OR via gurney.  Scope sites x3 CDI. Ice pack placed. Denies pain/nausea.     Sedation/Resp Status: Spontaneous eye opening to verbal. Respirations spontaneous and non-labored.    HR 75 SR; VSS on 6L 02 via mask.    Initiated STOP Bang per protocol.    1430: Cont stable, no changes.     1445: Cont stable, no changes to surgical site. Resting with eyes closed, occ snoring, Sats cont in 90s on 6L via mask.     1456: Report to Chanel HUSAIN.    Low Suspicion of COVID-19

## 2021-09-20 RX ORDER — HYOSCYAMINE SULFATE 0.12 MG/1
0.12 TABLET ORAL EVERY 6 HOURS
Qty: 120 | Refills: 0 | Status: ACTIVE | COMMUNITY
Start: 2021-09-20 | End: 1900-01-01

## 2021-09-26 PROBLEM — K31.89 GASTRIC STENOSIS: Status: ACTIVE | Noted: 2021-09-26

## 2021-09-26 NOTE — ASSESSMENT
[FreeTextEntry1] : Plan for repeat EGD with pneumatic dilation\par had benefit from initial dilation\par Still has some regurgitation and slow transit but reports significant improvement\par

## 2021-09-26 NOTE — HISTORY OF PRESENT ILLNESS
[FreeTextEntry1] : 61 with sleeve gastrectomy and chronic regurgitation and reflux and poor appetite\par Moderate incisura stenosis\par S/p pneumatic dilation with symptom improvement\par Continues to have some symptoms still but reassured that early satiety is the goal of the sleeve gastrectomy

## 2022-06-12 ENCOUNTER — EMERGENCY (EMERGENCY)
Facility: HOSPITAL | Age: 62
LOS: 1 days | Discharge: HOME | End: 2022-06-12
Attending: EMERGENCY MEDICINE | Admitting: EMERGENCY MEDICINE
Payer: COMMERCIAL

## 2022-06-12 VITALS
HEART RATE: 99 BPM | OXYGEN SATURATION: 97 % | DIASTOLIC BLOOD PRESSURE: 66 MMHG | TEMPERATURE: 98 F | SYSTOLIC BLOOD PRESSURE: 126 MMHG | RESPIRATION RATE: 18 BRPM

## 2022-06-12 VITALS
DIASTOLIC BLOOD PRESSURE: 71 MMHG | WEIGHT: 167.99 LBS | OXYGEN SATURATION: 99 % | RESPIRATION RATE: 20 BRPM | SYSTOLIC BLOOD PRESSURE: 147 MMHG | HEART RATE: 83 BPM | TEMPERATURE: 98 F | HEIGHT: 68 IN

## 2022-06-12 DIAGNOSIS — Z91.040 LATEX ALLERGY STATUS: ICD-10-CM

## 2022-06-12 DIAGNOSIS — Z88.8 ALLERGY STATUS TO OTHER DRUGS, MEDICAMENTS AND BIOLOGICAL SUBSTANCES: ICD-10-CM

## 2022-06-12 DIAGNOSIS — E66.01 MORBID (SEVERE) OBESITY DUE TO EXCESS CALORIES: ICD-10-CM

## 2022-06-12 DIAGNOSIS — Z98.890 OTHER SPECIFIED POSTPROCEDURAL STATES: Chronic | ICD-10-CM

## 2022-06-12 DIAGNOSIS — D64.9 ANEMIA, UNSPECIFIED: ICD-10-CM

## 2022-06-12 DIAGNOSIS — Z88.2 ALLERGY STATUS TO SULFONAMIDES: ICD-10-CM

## 2022-06-12 DIAGNOSIS — Z91.018 ALLERGY TO OTHER FOODS: ICD-10-CM

## 2022-06-12 DIAGNOSIS — K21.9 GASTRO-ESOPHAGEAL REFLUX DISEASE WITHOUT ESOPHAGITIS: ICD-10-CM

## 2022-06-12 DIAGNOSIS — J45.901 UNSPECIFIED ASTHMA WITH (ACUTE) EXACERBATION: ICD-10-CM

## 2022-06-12 DIAGNOSIS — Z88.0 ALLERGY STATUS TO PENICILLIN: ICD-10-CM

## 2022-06-12 DIAGNOSIS — Z91.013 ALLERGY TO SEAFOOD: ICD-10-CM

## 2022-06-12 DIAGNOSIS — E78.00 PURE HYPERCHOLESTEROLEMIA, UNSPECIFIED: ICD-10-CM

## 2022-06-12 DIAGNOSIS — I10 ESSENTIAL (PRIMARY) HYPERTENSION: ICD-10-CM

## 2022-06-12 DIAGNOSIS — R73.03 PREDIABETES: ICD-10-CM

## 2022-06-12 DIAGNOSIS — Z90.3 ACQUIRED ABSENCE OF STOMACH [PART OF]: ICD-10-CM

## 2022-06-12 DIAGNOSIS — R05.9 COUGH, UNSPECIFIED: ICD-10-CM

## 2022-06-12 LAB
ALBUMIN SERPL ELPH-MCNC: 3.8 G/DL — SIGNIFICANT CHANGE UP (ref 3.3–5)
ALP SERPL-CCNC: 83 U/L — SIGNIFICANT CHANGE UP (ref 40–120)
ALT FLD-CCNC: 21 U/L — SIGNIFICANT CHANGE UP (ref 10–45)
ANION GAP SERPL CALC-SCNC: 8 MMOL/L — SIGNIFICANT CHANGE UP (ref 5–17)
AST SERPL-CCNC: 24 U/L — SIGNIFICANT CHANGE UP (ref 10–40)
BASOPHILS # BLD AUTO: 0.07 K/UL — SIGNIFICANT CHANGE UP (ref 0–0.2)
BASOPHILS NFR BLD AUTO: 1 % — SIGNIFICANT CHANGE UP (ref 0–2)
BILIRUB SERPL-MCNC: 0.3 MG/DL — SIGNIFICANT CHANGE UP (ref 0.2–1.2)
BUN SERPL-MCNC: 14 MG/DL — SIGNIFICANT CHANGE UP (ref 7–23)
CALCIUM SERPL-MCNC: 9.4 MG/DL — SIGNIFICANT CHANGE UP (ref 8.4–10.5)
CHLORIDE SERPL-SCNC: 104 MMOL/L — SIGNIFICANT CHANGE UP (ref 96–108)
CO2 SERPL-SCNC: 29 MMOL/L — SIGNIFICANT CHANGE UP (ref 22–31)
CREAT SERPL-MCNC: 0.67 MG/DL — SIGNIFICANT CHANGE UP (ref 0.5–1.3)
EGFR: 100 ML/MIN/1.73M2 — SIGNIFICANT CHANGE UP
EOSINOPHIL # BLD AUTO: 0.18 K/UL — SIGNIFICANT CHANGE UP (ref 0–0.5)
EOSINOPHIL NFR BLD AUTO: 2.5 % — SIGNIFICANT CHANGE UP (ref 0–6)
GLUCOSE SERPL-MCNC: 114 MG/DL — HIGH (ref 70–99)
HCT VFR BLD CALC: 35.4 % — SIGNIFICANT CHANGE UP (ref 34.5–45)
HGB BLD-MCNC: 11 G/DL — LOW (ref 11.5–15.5)
IMM GRANULOCYTES NFR BLD AUTO: 0.1 % — SIGNIFICANT CHANGE UP (ref 0–1.5)
LYMPHOCYTES # BLD AUTO: 2.73 K/UL — SIGNIFICANT CHANGE UP (ref 1–3.3)
LYMPHOCYTES # BLD AUTO: 37.4 % — SIGNIFICANT CHANGE UP (ref 13–44)
MCHC RBC-ENTMCNC: 22.8 PG — LOW (ref 27–34)
MCHC RBC-ENTMCNC: 31.1 GM/DL — LOW (ref 32–36)
MCV RBC AUTO: 73.3 FL — LOW (ref 80–100)
MONOCYTES # BLD AUTO: 0.53 K/UL — SIGNIFICANT CHANGE UP (ref 0–0.9)
MONOCYTES NFR BLD AUTO: 7.3 % — SIGNIFICANT CHANGE UP (ref 2–14)
NEUTROPHILS # BLD AUTO: 3.77 K/UL — SIGNIFICANT CHANGE UP (ref 1.8–7.4)
NEUTROPHILS NFR BLD AUTO: 51.7 % — SIGNIFICANT CHANGE UP (ref 43–77)
NRBC # BLD: 0 /100 WBCS — SIGNIFICANT CHANGE UP (ref 0–0)
PLATELET # BLD AUTO: 272 K/UL — SIGNIFICANT CHANGE UP (ref 150–400)
POTASSIUM SERPL-MCNC: 4.6 MMOL/L — SIGNIFICANT CHANGE UP (ref 3.5–5.3)
POTASSIUM SERPL-SCNC: 4.6 MMOL/L — SIGNIFICANT CHANGE UP (ref 3.5–5.3)
PROT SERPL-MCNC: 7 G/DL — SIGNIFICANT CHANGE UP (ref 6–8.3)
RAPID RVP RESULT: SIGNIFICANT CHANGE UP
RBC # BLD: 4.83 M/UL — SIGNIFICANT CHANGE UP (ref 3.8–5.2)
RBC # FLD: 14.4 % — SIGNIFICANT CHANGE UP (ref 10.3–14.5)
SARS-COV-2 RNA SPEC QL NAA+PROBE: SIGNIFICANT CHANGE UP
SODIUM SERPL-SCNC: 141 MMOL/L — SIGNIFICANT CHANGE UP (ref 135–145)
WBC # BLD: 7.29 K/UL — SIGNIFICANT CHANGE UP (ref 3.8–10.5)
WBC # FLD AUTO: 7.29 K/UL — SIGNIFICANT CHANGE UP (ref 3.8–10.5)

## 2022-06-12 PROCEDURE — 71045 X-RAY EXAM CHEST 1 VIEW: CPT | Mod: 26

## 2022-06-12 PROCEDURE — 0225U NFCT DS DNA&RNA 21 SARSCOV2: CPT

## 2022-06-12 PROCEDURE — 99285 EMERGENCY DEPT VISIT HI MDM: CPT | Mod: 25

## 2022-06-12 PROCEDURE — 96374 THER/PROPH/DIAG INJ IV PUSH: CPT

## 2022-06-12 PROCEDURE — 85025 COMPLETE CBC W/AUTO DIFF WBC: CPT

## 2022-06-12 PROCEDURE — 36415 COLL VENOUS BLD VENIPUNCTURE: CPT

## 2022-06-12 PROCEDURE — 80053 COMPREHEN METABOLIC PANEL: CPT

## 2022-06-12 PROCEDURE — 94640 AIRWAY INHALATION TREATMENT: CPT

## 2022-06-12 PROCEDURE — 71045 X-RAY EXAM CHEST 1 VIEW: CPT

## 2022-06-12 RX ORDER — IPRATROPIUM/ALBUTEROL SULFATE 18-103MCG
3 AEROSOL WITH ADAPTER (GRAM) INHALATION ONCE
Refills: 0 | Status: COMPLETED | OUTPATIENT
Start: 2022-06-12 | End: 2022-06-12

## 2022-06-12 RX ORDER — ALBUTEROL 90 UG/1
2 AEROSOL, METERED ORAL
Qty: 1 | Refills: 0
Start: 2022-06-12

## 2022-06-12 RX ADMIN — Medication 3 MILLILITER(S): at 03:00

## 2022-06-12 RX ADMIN — Medication 3 MILLILITER(S): at 02:17

## 2022-06-12 RX ADMIN — Medication 125 MILLIGRAM(S): at 02:17

## 2022-06-12 NOTE — ED PROVIDER NOTE - CPE EDP CARDIAC NORM
Chief Complaint:  Fever (last night and this morning 100.5 and it did go down with tylenol. ) and Headache (arms and legs hurt as well started two days ago. )    History of present illness:  Desiree is a 9 year old female who presents with [facility] with concerns of fever up to 100.5, headache and arm and leg pain that began yesterday.  The headache is frontal.  The headache is pounding today.  The headache improved with Tylenol. She complained of nasal congestion last night.  She has not had any rhinorrhea or cough.      Medications:    methylpheniDATE (RITALIN LA) 20 MG 24 hr capsule (LA)  hydrOXYzine (ATARAX) 25 MG tablet    No current facility-administered medications on file prior to visit.     Allergies:  Allergies as of 03/01/2021   • (No Known Allergies)       Physical examination:  Visit Vitals  BP 92/60   Pulse 76   Temp 97.9 °F (36.6 °C)   Resp (!) 16   Wt 22.5 kg     General: Alert, cooperative and well-appearing.  HEENT:  Normocephalic and atraumatic; mucous membranes are moist without oropharyngeal erythema.  There is no tonsillar hypertrophy or exudates present. Ears are normal set. Tympanic membranes are clear bilaterally without erythema or fluid present. Nasal septum is midline. She has moderate nasal congestion without discharge at this time.  Neck:  Nontender. There is no cervical lymphadenopathy.  Lungs: Easy respiratory effort. Clear to auscultation bilaterally without wheezes rales or rhonchi.  Cardiovascular:  Heart rate is regular without murmur.  Abdomen:  Soft, nontender and non-distended.  Extremities:  Warm and well-perfused. There are no rashes present.    ASSESSMENT/PLAN:     1. Acute URI    2. Acute nonintractable headache, unspecified headache type      We discussed the possibility that her symptoms are due to COVID-19, however, since she has only had symptoms since last night, false negative are more likely.  Grandma opted not to test for COVID-19 today, but will schedule a  visit on Friday for testing with me.    We discussed quarantine recommendations as well.      Continue symptomatic treatment.    Return if symptoms worsen or fail to improve.     normal...

## 2022-06-12 NOTE — ED PROVIDER NOTE - CLINICAL SUMMARY MEDICAL DECISION MAKING FREE TEXT BOX
Pt with cough causing asthma exacerbation, PE - nad, lungs - mild wheeze bilaterally, pt does have reactive cough, vitals wnl, labs and cxr done - no evidence of pna. Pt feels better after nebs and steroids in ED - will dc with inhaler and steroids.  Pt understands discharge instructions and follow up precautions.

## 2022-06-12 NOTE — ED ADULT NURSE NOTE - CAS DISCH CONDITION
Post-Anesthesia Evaluation and Assessment    Patient: Mikal Maldonado MRN: [id number]  SSN: [id number]    YOB: 1936  Age: 80 y.o. Sex: male       Cardiovascular Function/Vital Signs  Visit Vitals    /76    Pulse 74    Temp 36.9 °C (98.4 °F)    Resp 18    Ht 5' 11\" (1.803 m)    Wt 91.2 kg (201 lb)    SpO2 98%    BMI 28.03 kg/m2       Patient is status post MAC anesthesia for Procedure(s):  ESOPHAGOGASTRODUODENOSCOPY (EGD)  ESOPHAGOGASTRODUODENAL (EGD) BIOPSY. Nausea/Vomiting: None    Postoperative hydration reviewed and adequate. Pain:  Pain Scale 1: Numeric (0 - 10) (06/22/18 1325)  Pain Intensity 1: 0 (06/22/18 1325)   Managed    Neurological Status:   Neuro  Neurologic State: Alert; Appropriate for age (06/22/18 1130)  Orientation Level: Oriented X4 (06/22/18 1130)  Cognition: Appropriate for age attention/concentration; Appropriate safety awareness (06/22/18 1130)  Speech: Clear (06/22/18 1130)  Assessment L Pupil: Brisk (06/22/18 1130)  Size L Pupil (mm): 4 (06/22/18 0905)  Assessment R Pupil: Brisk (06/22/18 1130)  Size R Pupil (mm): 4 (06/22/18 0905)  LUE Motor Response: Purposeful (06/22/18 1130)  LLE Motor Response: Purposeful (06/22/18 1130)  RUE Motor Response: Purposeful (06/22/18 1130)  RLE Motor Response: Purposeful (06/22/18 1130)   At baseline    Mental Status and Level of Consciousness: Arousable    Pulmonary Status:   O2 Device: Room air (06/22/18 1325)   Adequate oxygenation and airway patent    Complications related to anesthesia: None    Post-anesthesia assessment completed.  No concerns    Signed By: Milo Rios MD     June 22, 2018 Stable

## 2022-06-12 NOTE — ED ADULT NURSE NOTE - OBJECTIVE STATEMENT
Pt presented to the ED with complaints of a cough that started 5 days ago. Pt states that for the last 5 days, she has been sleeping on a rug. Pt denies fever, chills, runny nose, chest pain, palpitations, nausea, or vomiting.

## 2022-06-12 NOTE — ED PROVIDER NOTE - OBJECTIVE STATEMENT
Patient is a 61 year old female with a history of asthma, HTN, hyperlipidemia, GERD, borderline DM, and morbid obesity s/p gastric sleeve gastrectomy by Dr. Salcido 5/11/21 Patient is a 61 year old female with a history of asthma, HTN, hyperlipidemia, GERD, borderline DM, and morbid obesity s/p gastric sleeve gastrectomy by Dr. Salcido 5/11/21 presents to ED with cough x several days causing asthma exacerbation.  Pt has not had fever or chills.  Tested for COVID several times and was negative.  No hx of intubation.  Has not taken meds for symptoms.

## 2022-06-12 NOTE — ED PROVIDER NOTE - PATIENT PORTAL LINK FT
You can access the FollowMyHealth Patient Portal offered by [facility] by registering at the following website: [email or web address]. By joining [employer]’s FollowMyHealth portal, you will also be able to view your health information using other applications (apps) compatible with our system.

## 2022-08-17 ENCOUNTER — EMERGENCY (EMERGENCY)
Facility: HOSPITAL | Age: 62
LOS: 1 days | Discharge: HOME | End: 2022-08-17
Attending: EMERGENCY MEDICINE | Admitting: EMERGENCY MEDICINE
Payer: COMMERCIAL

## 2022-08-17 VITALS
HEART RATE: 83 BPM | HEIGHT: 68 IN | OXYGEN SATURATION: 99 % | TEMPERATURE: 99 F | DIASTOLIC BLOOD PRESSURE: 92 MMHG | RESPIRATION RATE: 20 BRPM | WEIGHT: 139.99 LBS | SYSTOLIC BLOOD PRESSURE: 190 MMHG

## 2022-08-17 DIAGNOSIS — Z98.890 OTHER SPECIFIED POSTPROCEDURAL STATES: Chronic | ICD-10-CM

## 2022-08-17 LAB
ALBUMIN SERPL ELPH-MCNC: 3.8 G/DL — SIGNIFICANT CHANGE UP (ref 3.3–5)
ALP SERPL-CCNC: 94 U/L — SIGNIFICANT CHANGE UP (ref 40–120)
ALT FLD-CCNC: 46 U/L — HIGH (ref 10–45)
ANION GAP SERPL CALC-SCNC: 13 MMOL/L — SIGNIFICANT CHANGE UP (ref 5–17)
APPEARANCE UR: CLEAR — SIGNIFICANT CHANGE UP
APTT BLD: 27.5 SEC — SIGNIFICANT CHANGE UP (ref 27.5–35.5)
AST SERPL-CCNC: 44 U/L — HIGH (ref 10–40)
BASOPHILS # BLD AUTO: 0.01 K/UL — SIGNIFICANT CHANGE UP (ref 0–0.2)
BASOPHILS NFR BLD AUTO: 0.3 % — SIGNIFICANT CHANGE UP (ref 0–2)
BILIRUB SERPL-MCNC: 0.4 MG/DL — SIGNIFICANT CHANGE UP (ref 0.2–1.2)
BILIRUB UR-MCNC: NEGATIVE — SIGNIFICANT CHANGE UP
BUN SERPL-MCNC: 6 MG/DL — LOW (ref 7–23)
CALCIUM SERPL-MCNC: 9.5 MG/DL — SIGNIFICANT CHANGE UP (ref 8.4–10.5)
CHLORIDE SERPL-SCNC: 97 MMOL/L — SIGNIFICANT CHANGE UP (ref 96–108)
CK MB CFR SERPL CALC: 1.5 NG/ML — SIGNIFICANT CHANGE UP (ref 0–6.7)
CK SERPL-CCNC: 83 U/L — SIGNIFICANT CHANGE UP (ref 25–170)
CO2 SERPL-SCNC: 23 MMOL/L — SIGNIFICANT CHANGE UP (ref 22–31)
COLOR SPEC: YELLOW — SIGNIFICANT CHANGE UP
CREAT SERPL-MCNC: 0.52 MG/DL — SIGNIFICANT CHANGE UP (ref 0.5–1.3)
DIFF PNL FLD: ABNORMAL
EGFR: 106 ML/MIN/1.73M2 — SIGNIFICANT CHANGE UP
EOSINOPHIL # BLD AUTO: 0 K/UL — SIGNIFICANT CHANGE UP (ref 0–0.5)
EOSINOPHIL NFR BLD AUTO: 0 % — SIGNIFICANT CHANGE UP (ref 0–6)
EPI CELLS # UR: SIGNIFICANT CHANGE UP /HPF (ref 0–5)
GLUCOSE SERPL-MCNC: 109 MG/DL — HIGH (ref 70–99)
GLUCOSE UR QL: NEGATIVE — SIGNIFICANT CHANGE UP
HCT VFR BLD CALC: 34.4 % — LOW (ref 34.5–45)
HGB BLD-MCNC: 10.9 G/DL — LOW (ref 11.5–15.5)
IMM GRANULOCYTES NFR BLD AUTO: 0.3 % — SIGNIFICANT CHANGE UP (ref 0–1.5)
INR BLD: 1.16 — SIGNIFICANT CHANGE UP (ref 0.88–1.16)
KETONES UR-MCNC: NEGATIVE — SIGNIFICANT CHANGE UP
LEUKOCYTE ESTERASE UR-ACNC: NEGATIVE — SIGNIFICANT CHANGE UP
LYMPHOCYTES # BLD AUTO: 0.45 K/UL — LOW (ref 1–3.3)
LYMPHOCYTES # BLD AUTO: 13.9 % — SIGNIFICANT CHANGE UP (ref 13–44)
MAGNESIUM SERPL-MCNC: 1.7 MG/DL — SIGNIFICANT CHANGE UP (ref 1.6–2.6)
MCHC RBC-ENTMCNC: 23 PG — LOW (ref 27–34)
MCHC RBC-ENTMCNC: 31.7 GM/DL — LOW (ref 32–36)
MCV RBC AUTO: 72.7 FL — LOW (ref 80–100)
MONOCYTES # BLD AUTO: 0.45 K/UL — SIGNIFICANT CHANGE UP (ref 0–0.9)
MONOCYTES NFR BLD AUTO: 13.9 % — SIGNIFICANT CHANGE UP (ref 2–14)
NEUTROPHILS # BLD AUTO: 2.31 K/UL — SIGNIFICANT CHANGE UP (ref 1.8–7.4)
NEUTROPHILS NFR BLD AUTO: 71.6 % — SIGNIFICANT CHANGE UP (ref 43–77)
NITRITE UR-MCNC: NEGATIVE — SIGNIFICANT CHANGE UP
NRBC # BLD: 0 /100 WBCS — SIGNIFICANT CHANGE UP (ref 0–0)
PH UR: 7 — SIGNIFICANT CHANGE UP (ref 5–8)
PLATELET # BLD AUTO: 220 K/UL — SIGNIFICANT CHANGE UP (ref 150–400)
POTASSIUM SERPL-MCNC: 3.7 MMOL/L — SIGNIFICANT CHANGE UP (ref 3.5–5.3)
POTASSIUM SERPL-SCNC: 3.7 MMOL/L — SIGNIFICANT CHANGE UP (ref 3.5–5.3)
PROT SERPL-MCNC: 6.7 G/DL — SIGNIFICANT CHANGE UP (ref 6–8.3)
PROT UR-MCNC: NEGATIVE MG/DL — SIGNIFICANT CHANGE UP
PROTHROM AB SERPL-ACNC: 13.8 SEC — HIGH (ref 10.5–13.4)
RBC # BLD: 4.73 M/UL — SIGNIFICANT CHANGE UP (ref 3.8–5.2)
RBC # FLD: 14.4 % — SIGNIFICANT CHANGE UP (ref 10.3–14.5)
RBC CASTS # UR COMP ASSIST: < 5 /HPF — SIGNIFICANT CHANGE UP
SODIUM SERPL-SCNC: 133 MMOL/L — LOW (ref 135–145)
SP GR SPEC: <=1.005 — SIGNIFICANT CHANGE UP (ref 1–1.03)
TROPONIN T SERPL-MCNC: 0.01 NG/ML — SIGNIFICANT CHANGE UP (ref 0–0.01)
UROBILINOGEN FLD QL: 0.2 E.U./DL — SIGNIFICANT CHANGE UP
WBC # BLD: 3.23 K/UL — LOW (ref 3.8–10.5)
WBC # FLD AUTO: 3.23 K/UL — LOW (ref 3.8–10.5)
WBC UR QL: < 5 /HPF — SIGNIFICANT CHANGE UP

## 2022-08-17 PROCEDURE — 71045 X-RAY EXAM CHEST 1 VIEW: CPT | Mod: 26

## 2022-08-17 PROCEDURE — 99284 EMERGENCY DEPT VISIT MOD MDM: CPT

## 2022-08-17 RX ORDER — BEBTELOVIMAB 87.5 MG/ML
175 INJECTION, SOLUTION INTRAVENOUS ONCE
Refills: 0 | Status: COMPLETED | OUTPATIENT
Start: 2022-08-17 | End: 2022-08-17

## 2022-08-17 RX ORDER — ACETAMINOPHEN 500 MG
1000 TABLET ORAL ONCE
Refills: 0 | Status: COMPLETED | OUTPATIENT
Start: 2022-08-17 | End: 2022-08-17

## 2022-08-17 RX ORDER — FAMOTIDINE 10 MG/ML
20 INJECTION INTRAVENOUS ONCE
Refills: 0 | Status: COMPLETED | OUTPATIENT
Start: 2022-08-17 | End: 2022-08-17

## 2022-08-17 RX ORDER — SODIUM CHLORIDE 9 MG/ML
1000 INJECTION INTRAMUSCULAR; INTRAVENOUS; SUBCUTANEOUS ONCE
Refills: 0 | Status: COMPLETED | OUTPATIENT
Start: 2022-08-17 | End: 2022-08-17

## 2022-08-17 RX ORDER — METOCLOPRAMIDE HCL 10 MG
10 TABLET ORAL ONCE
Refills: 0 | Status: COMPLETED | OUTPATIENT
Start: 2022-08-17 | End: 2022-08-17

## 2022-08-17 RX ADMIN — FAMOTIDINE 20 MILLIGRAM(S): 10 INJECTION INTRAVENOUS at 20:41

## 2022-08-17 RX ADMIN — Medication 400 MILLIGRAM(S): at 20:41

## 2022-08-17 RX ADMIN — SODIUM CHLORIDE 1000 MILLILITER(S): 9 INJECTION INTRAMUSCULAR; INTRAVENOUS; SUBCUTANEOUS at 20:40

## 2022-08-17 RX ADMIN — Medication 104 MILLIGRAM(S): at 20:41

## 2022-08-17 NOTE — ED PROVIDER NOTE - CLINICAL SUMMARY MEDICAL DECISION MAKING FREE TEXT BOX
61 y/o F here with severe HA, nausea, vomiting, abdominal cramping and CP with a concern for possible Covid. Pt is well appearing with a normal exam. Pts vital signs are stable and will check labs, CXR, EKG, address pain and reevaluate.

## 2022-08-17 NOTE — ED PROVIDER NOTE - NS ED ATTENDING STATEMENT MOD
Right arm; This was a shared visit with the SHOBHA. I reviewed and verified the documentation and independently performed the documented:

## 2022-08-17 NOTE — ED PROVIDER NOTE - ATTENDING APP SHARED VISIT CONTRIBUTION OF CARE
63 yo f w hx of bariatric sx, HTN, GERD presents to ED with concern for HA, abd pain, cp, arm pain, feeling weak / tired, covid positive (tested at home), under a lot of stress at home.  EKG non ischemic.  COVID confirmed positive.  On exam, patient is well appearing, non toxic.  No focal neuro deficits.  HRRR, lungs clear.  Abd soft, NT/ND.  Will obtain CXR, offer ab infusion and dispo accordingly.

## 2022-08-17 NOTE — ED PROVIDER NOTE - PATIENT PORTAL LINK FT
You can access the FollowMyHealth Patient Portal offered by [facility] by registering at the following website: [email or web address]. By joining [employer]’s FollowMyHealth portal, you will also be able to view your health information using other applications (apps) compatible with our system.

## 2022-08-17 NOTE — ED PROVIDER NOTE - NEUROLOGICAL, MLM
Alert and oriented, no focal deficits, no motor or sensory deficits and neurovascularly intact. Normal strength, sensation and ROM to all extremities with no focal deficits.

## 2022-08-17 NOTE — ED ADULT TRIAGE NOTE - CHIEF COMPLAINT QUOTE
midsternal CP radiating down L arm and frontal HA since yesterday morning. denies blurry vision, dizziness, SOB, numbness/tingling. denies PMH.

## 2022-08-17 NOTE — ED PROVIDER NOTE - NSFOLLOWUPINSTRUCTIONS_ED_ALL_ED_FT
Bebtelovimab (Injection) (Injectable) - DrugNote           Bebtelovimab (By injection)  Bebtelovimab (bjt-[id number]-GVY-[id number]-mab)    Treats coronavirus disease 2019 (COVID-19).    Brand Name(s):  There may be other brand names for this medicine.    When This Medicine Should Not Be Used:  This medicine is not right for everyone. You should not receive it if you had an allergic reaction to bebtelovimab.    How to Use This Medicine:  Injectable  •Your doctor will prescribe your dose and schedule. This medicine is given through a needle placed in a vein. It must be injected slowly, so your IV tube will need to stay in place for at least 30 seconds.  •A nurse or other health provider will give you this medicine.   •You should receive this medicine within 7 days of the onset of COVID-19 symptoms.  •This medicine comes with a Fact Sheet for Patients, Parents, and Caregivers. Read and follow the information in the Fact Sheet carefully. Ask your doctor if you have any questions.    Drugs and Foods to Avoid:        Ask your doctor or pharmacist before using any other medicine, including over-the-counter medicines, vitamins, and herbal products.      Warnings While Using This Medicine:  •Tell your doctor if you are pregnant or breastfeeding.  •This medicine may cause serious infusion-related reactions, which can be life-threatening.  •Your doctor will do lab tests at regular visits to check on the effects of this medicine. Keep all appointments.     Possible Side Effects While Using This Medicine:  Call your doctor right away if you notice any of these side effects:  •Allergic reaction: Itching or hives, swelling in your face or hands, swelling or tingling in your mouth or throat, chest tightness, trouble breathing  •Fever, chills, sweating, lightheadedness, dizziness, fainting, fast, slow, or uneven heartbeat    If you notice these less serious side effects, talk with your doctor:  •Pain, itching, burning, swelling, or a lump under your skin where the needle is placed    If you notice other side effects that you think are caused by this medicine, tell your doctor.  Call your doctor for medical advice about side effects. You may report side effects to FDA at 5-929-FDA-1553                    COVID-19 (CORONAVIRUS DISEASE 2019) - AfterCare(R) Instructions(ER/ED)           COVID-19 (Coronavirus Disease 2019)    WHAT YOU NEED TO KNOW:    COVID-19 is the disease caused by a coronavirus first discovered in December 2019. Coronaviruses generally cause upper respiratory (nose, throat, and lung) infections, such as a cold. The 2019 virus spreads quickly and easily. It can be spread starting 2 to 3 days before symptoms even begin.    DISCHARGE INSTRUCTIONS:    Call your local emergency number (911 in the ) if:   •You have trouble breathing or shortness of breath at rest.      •You have chest pain or pressure that lasts longer than 5 minutes.      •You become confused or hard to wake.      •Your lips or face are blue.      Return to the emergency department if:   •You have a fever of 104°F (40°C) or higher.          Call your doctor if:   •You have symptoms of COVID-19.      •You have questions or concerns about your condition or care.      Medicines: You may need any of the following:  •Decongestants help reduce nasal congestion and help you breathe more easily. If you take decongestant pills, they may make you feel restless or cause problems with your sleep. Do not use decongestant sprays for more than a few days.      •Cough suppressants help reduce coughing. Ask your healthcare provider which type of cough medicine is best for you.      •Acetaminophen decreases pain and fever. It is available without a doctor's order. Ask how much to take and how often to take it. Follow directions. Read the labels of all other medicines you are using to see if they also contain acetaminophen, or ask your doctor or pharmacist. Acetaminophen can cause liver damage if not taken correctly.      •NSAIDs, such as ibuprofen, help decrease swelling, pain, and fever. This medicine is available with or without a doctor's order. NSAIDs can cause stomach bleeding or kidney problems in certain people. If you take blood thinner medicine, always ask your healthcare provider if NSAIDs are safe for you. Always read the medicine label and follow directions.      •Blood thinners help prevent blood clots. Clots can cause strokes, heart attacks, and death. The following are general safety guidelines to follow while you are taking a blood thinner:?Watch for bleeding and bruising while you take blood thinners. Watch for bleeding from your gums or nose. Watch for blood in your urine and bowel movements. Use a soft washcloth on your skin, and a soft toothbrush to brush your teeth. This can keep your skin and gums from bleeding. If you shave, use an electric shaver. Do not play contact sports.       ?Tell your dentist and other healthcare providers that you take a blood thinner. Wear a bracelet or necklace that says you take this medicine.       ?Do not start or stop any other medicines unless your healthcare provider tells you to. Many medicines cannot be used with blood thinners.      ?Take your blood thinner exactly as prescribed by your healthcare provider. Do not skip does or take less than prescribed. Tell your provider right away if you forget to take your blood thinner, or if you take too much.      ?Warfarin is a blood thinner that you may need to take. The following are things you should be aware of if you take warfarin: ?Foods and medicines can affect the amount of warfarin in your blood. Do not make major changes to your diet while you take warfarin. Warfarin works best when you eat about the same amount of vitamin K every day. Vitamin K is found in green leafy vegetables and certain other foods. Ask for more information about what to eat when you are taking warfarin.      ?You will need to see your healthcare provider for follow-up visits when you are on warfarin. You will need regular blood tests. These tests are used to decide how much medicine you need.         •Take your medicine as directed. Contact your healthcare provider if you think your medicine is not helping or if you have side effects. Tell him or her if you are allergic to any medicine. Keep a list of the medicines, vitamins, and herbs you take. Include the amounts, and when and why you take them. Bring the list or the pill bottles to follow-up visits. Carry your medicine list with you in case of an emergency.      What you need to know about variants: The virus has changed into several new forms (called variants) since it was discovered. The variants may be more contagious (easily spread) than the original form. Some may also cause more severe illness than others.    What you need to know about COVID-19 vaccines: Healthcare providers recommend a COVID-19 vaccine, even if you have already had COVID-19. You are considered fully vaccinated against COVID-19 two weeks after the final dose of any COVID-19 vaccine. Let your healthcare provider know when you have received the final dose of the vaccine. Make a copy of your vaccination card. Keep the original with you in case you need to show it. Keep the copy in a safe place.  •Get a COVID-19 vaccine as directed. Vaccination is recommended for everyone 6 months or older. COVID-19 vaccines are given as a shot in 1 to 3 doses as a primary series. This depends on the vaccine brand and the age of the person who receives it. A booster dose is recommended for everyone 5 years or older after the primary series is complete. A second booster is recommended for all adults 50 or older and for immunocompromised adolescents. The second booster is also recommended for anyone who got the 1-dose brand of vaccine for the first dose and a booster. Your provider can give you more information on boosters. He or she can help you schedule all needed doses.  Recommended COVID-19 Immunization Schedule           •Continue social distancing and other measures. You can become infected even after you get the vaccine. You may also be able to pass the virus to others without knowing you are infected.      •After you get the vaccine, check local, national, and international travel rules. You may need to be tested before you travel. Some countries require proof of a negative test before you travel. You may also need to quarantine after you return.      •Medicine may be given to prevent infection. The medicine can be given if you are at high risk for infection and cannot get the vaccine. It can also be given if your immune system does not respond well to the vaccine.      How the 2019 coronavirus spreads:   •Droplets are the main way all coronaviruses spread. The virus travels in droplets that form when a person talks, sings, coughs, or sneezes. The droplets can also float in the air for minutes or hours. Infection happens when you breathe in the droplets or get them in your eyes or nose. Close personal contact with an infected person increases your risk for infection. This means being within 6 feet (2 meters) of the person for at least 15 minutes over 24 hours.      •Person-to-person contact can spread the virus. For example, a person with the virus on his or her hands can spread it by shaking hands with someone.      •The virus can stay on objects and surfaces for up to 3 days. You may become infected by touching the object or surface and then touching your eyes or mouth.      Help lower the risk for COVID-19:   •Wash your hands often throughout the day. Use soap and water. Rub your soapy hands together, lacing your fingers, for at least 20 seconds. Rinse with warm, running water. Dry your hands with a clean towel or paper towel. Use hand  that contains alcohol if soap and water are not available. Teach children how to wash their hands and use hand .  Handwashing           •Cover sneezes and coughs. Turn your face away and cover your mouth and nose with a tissue. Throw the tissue away. Use the bend of your arm if a tissue is not available. Then wash your hands well with soap and water or use hand . Teach children how to cover a cough or sneeze.      •Wear a face covering (mask) when needed. Use a cloth covering with at least 2 layers. You can also create layers by putting a cloth covering over a disposable non-medical mask. Cover your mouth and your nose.  How to Wear a Face Covering (Mask)           •Follow worldwide, national, and local social distancing guidelines. Keep at least 6 feet (2 meters) between you and others.      •Try not to touch your face. If you get the virus on your hands, you can transfer it to your eyes, nose, or mouth and become infected. You can also transfer it to objects, surfaces, or people.      •Clean and disinfect high-touch surfaces and objects often. Use disinfecting wipes, or make a solution of 4 teaspoons of bleach in 1 quart (4 cups) of water.      •Ask about other vaccines you may need. Get the influenza (flu) vaccine as soon as recommended each year, usually starting in September or October. Get the pneumonia vaccine if recommended. Your healthcare provider can tell you if you should also get other vaccines, and when to get them.    Prevent COVID-19 Infection         Follow social distancing guidelines: National and local social distancing rules vary. Rules and restrictions may change over time as restrictions are lifted. The following are general guidelines:  •Stay home if you are sick or think you may have COVID-19. It is important to stay home if you are waiting for a testing appointment or for test results.      •Avoid close physical contact with anyone who does not live in your home. Do not shake hands with, hug, or kiss a person as a greeting. If you must use public transportation (such as a bus or subway), try to sit or stand away from others. Wear your face covering.      •Avoid in-person gatherings and crowds. Attend virtually if possible.      Follow up with your doctor as directed: Write down your questions so you remember to ask them during your visits.    For more information:   •Centers for Disease Control and Prevention  [address] [place] [address]  Phone: 1-359.105.8620  Web Address: http://www.cdc.gov

## 2022-08-17 NOTE — ED ADULT NURSE NOTE - OBJECTIVE STATEMENT
62 y.o female a&ox3, speaking in full sentences, no acute distress. Pt reports to ED c/o midsternal chest pain, left arm tingling, frontal headache x yesterday. Pt reports episodes of vomiting and diarrhea. Recently exposed to COVID + person. Denies sob, change in vision, dizziness, blood in stool or emesis, fever.

## 2022-08-17 NOTE — ED PROVIDER NOTE - OBJECTIVE STATEMENT
61 y/o F with a PMHX of bariatric surgery in the past, asthma, HTN, and GERD presents to the ED c/o severe HA since yesterday with lower abdominal cramping and pain, nausea, vomiting, diffused mid chest pain, jaw pain, and left arm pain. PT denies any SOB, palpitations, fevers, or chills. Pt states she is under a lot of stress due to her being in court with the recent death of her daughter from an accident and her grandson being in a custody ballesteros with the grandfather, as the pt was rasing the grandson.

## 2022-08-18 VITALS
DIASTOLIC BLOOD PRESSURE: 84 MMHG | OXYGEN SATURATION: 99 % | TEMPERATURE: 98 F | RESPIRATION RATE: 16 BRPM | SYSTOLIC BLOOD PRESSURE: 169 MMHG | HEART RATE: 64 BPM

## 2022-08-18 PROCEDURE — 36415 COLL VENOUS BLD VENIPUNCTURE: CPT

## 2022-08-18 PROCEDURE — 82550 ASSAY OF CK (CPK): CPT

## 2022-08-18 PROCEDURE — 96366 THER/PROPH/DIAG IV INF ADDON: CPT

## 2022-08-18 PROCEDURE — 83735 ASSAY OF MAGNESIUM: CPT

## 2022-08-18 PROCEDURE — 96365 THER/PROPH/DIAG IV INF INIT: CPT

## 2022-08-18 PROCEDURE — 80053 COMPREHEN METABOLIC PANEL: CPT

## 2022-08-18 PROCEDURE — 87635 SARS-COV-2 COVID-19 AMP PRB: CPT

## 2022-08-18 PROCEDURE — M0222: CPT

## 2022-08-18 PROCEDURE — 85025 COMPLETE CBC W/AUTO DIFF WBC: CPT

## 2022-08-18 PROCEDURE — 85610 PROTHROMBIN TIME: CPT

## 2022-08-18 PROCEDURE — 85730 THROMBOPLASTIN TIME PARTIAL: CPT

## 2022-08-18 PROCEDURE — 81001 URINALYSIS AUTO W/SCOPE: CPT

## 2022-08-18 PROCEDURE — 96368 THER/DIAG CONCURRENT INF: CPT

## 2022-08-18 PROCEDURE — 99285 EMERGENCY DEPT VISIT HI MDM: CPT | Mod: 25

## 2022-08-18 PROCEDURE — 84484 ASSAY OF TROPONIN QUANT: CPT

## 2022-08-18 PROCEDURE — 96375 TX/PRO/DX INJ NEW DRUG ADDON: CPT

## 2022-08-18 PROCEDURE — 71045 X-RAY EXAM CHEST 1 VIEW: CPT

## 2022-08-18 PROCEDURE — 82553 CREATINE MB FRACTION: CPT

## 2022-08-18 RX ADMIN — Medication 10 MILLIGRAM(S): at 01:21

## 2022-08-18 RX ADMIN — BEBTELOVIMAB 175 MILLIGRAM(S): 87.5 INJECTION, SOLUTION INTRAVENOUS at 01:20

## 2022-08-18 RX ADMIN — Medication 1000 MILLIGRAM(S): at 01:21

## 2022-08-18 RX ADMIN — SODIUM CHLORIDE 1000 MILLILITER(S): 9 INJECTION INTRAMUSCULAR; INTRAVENOUS; SUBCUTANEOUS at 01:21

## 2022-08-18 NOTE — ED ADULT NURSE REASSESSMENT NOTE - NS ED NURSE REASSESS COMMENT FT1
pt given water and sandwich. in no acute distress. pending evaluation after MAB transfusion. pt updated on plan of care. understanding verbalized

## 2022-08-20 DIAGNOSIS — K21.9 GASTRO-ESOPHAGEAL REFLUX DISEASE WITHOUT ESOPHAGITIS: ICD-10-CM

## 2022-08-20 DIAGNOSIS — M32.9 SYSTEMIC LUPUS ERYTHEMATOSUS, UNSPECIFIED: ICD-10-CM

## 2022-08-20 DIAGNOSIS — E78.5 HYPERLIPIDEMIA, UNSPECIFIED: ICD-10-CM

## 2022-08-20 DIAGNOSIS — Z91.030 BEE ALLERGY STATUS: ICD-10-CM

## 2022-08-20 DIAGNOSIS — R73.03 PREDIABETES: ICD-10-CM

## 2022-08-20 DIAGNOSIS — Z88.8 ALLERGY STATUS TO OTHER DRUGS, MEDICAMENTS AND BIOLOGICAL SUBSTANCES STATUS: ICD-10-CM

## 2022-08-20 DIAGNOSIS — M79.602 PAIN IN LEFT ARM: ICD-10-CM

## 2022-08-20 DIAGNOSIS — J45.909 UNSPECIFIED ASTHMA, UNCOMPLICATED: ICD-10-CM

## 2022-08-20 DIAGNOSIS — R51.9 HEADACHE, UNSPECIFIED: ICD-10-CM

## 2022-08-20 DIAGNOSIS — Z91.018 ALLERGY TO OTHER FOODS: ICD-10-CM

## 2022-08-20 DIAGNOSIS — Z98.84 BARIATRIC SURGERY STATUS: ICD-10-CM

## 2022-08-20 DIAGNOSIS — U07.1 COVID-19: ICD-10-CM

## 2022-08-20 DIAGNOSIS — Z91.013 ALLERGY TO SEAFOOD: ICD-10-CM

## 2022-08-20 DIAGNOSIS — Z88.2 ALLERGY STATUS TO SULFONAMIDES: ICD-10-CM

## 2022-08-20 DIAGNOSIS — E66.01 MORBID (SEVERE) OBESITY DUE TO EXCESS CALORIES: ICD-10-CM

## 2022-08-20 DIAGNOSIS — D64.9 ANEMIA, UNSPECIFIED: ICD-10-CM

## 2022-08-20 DIAGNOSIS — Z88.1 ALLERGY STATUS TO OTHER ANTIBIOTIC AGENTS STATUS: ICD-10-CM

## 2022-08-20 DIAGNOSIS — R10.30 LOWER ABDOMINAL PAIN, UNSPECIFIED: ICD-10-CM

## 2022-08-20 DIAGNOSIS — R68.84 JAW PAIN: ICD-10-CM

## 2022-08-20 DIAGNOSIS — Z91.048 OTHER NONMEDICINAL SUBSTANCE ALLERGY STATUS: ICD-10-CM

## 2022-08-20 DIAGNOSIS — I10 ESSENTIAL (PRIMARY) HYPERTENSION: ICD-10-CM

## 2022-08-20 DIAGNOSIS — Z88.0 ALLERGY STATUS TO PENICILLIN: ICD-10-CM

## 2022-08-20 DIAGNOSIS — R11.2 NAUSEA WITH VOMITING, UNSPECIFIED: ICD-10-CM

## 2022-08-20 DIAGNOSIS — Z91.040 LATEX ALLERGY STATUS: ICD-10-CM

## 2022-12-08 NOTE — ED PROVIDER NOTE - NS ED MD DISPO ADMITTING SERVICE
[address]  [facility] EMERGENCY DEPT  [address] [address] [id number] [phone]    Work/School Note    Date: 12/8/2022    To Whom It May concern:    Lelo Somers was seen and treated today in the emergency room by the following provider(s):  Attending Provider: Phillip Diaz MD  Physician Assistant: ROBIN Singh. Lelo Somers may return to work on [id number].     Sincerely,          ROBIN Padilla
SURG

## 2023-09-20 NOTE — H&P ADULT - NSHPSOURCEINFORD_GEN_ALL_CORE
Dr. SANFORD to review and sign    Patient's medication list and problem list printed and attached to form.   Chart(s)/Patient

## 2024-05-08 NOTE — ED PROVIDER NOTE - CPE EDP ENMT NORM
Use antibiotic ointment to the affected site.  Keep wound clean and dry.  Take tylenol and/or ibuprofen for pain relief.  Return for severe/worsening symptoms.  
normal...

## 2024-10-02 NOTE — DISCHARGE NOTE NURSING/CASE MANAGEMENT/SOCIAL WORK - NSDCPEELIQUISDIET_GEN_ALL_CORE
Care Transitions Note    Follow Up Call     Patient Current Location:  Home: [address] [place] [id number]    Care Transition Nurse contacted the patient by telephone. Verified name and  as identifiers.    Additional needs identified to be addressed with provider   No needs identified                 Method of communication with provider: none.    Care Summary Note: Patient reports that she is feeling much better.  She is taking zofran as directed and that is helping significantly, she reports.  She will follow up with pulmonology on the  and was inquiring about when she can be around people.  CTN explained CDC guidelines to remain at home if you have symptoms and you can resume normal activities if you are feeling better, symptoms are improving and you have not had a fever in 24 hours, she verbalized understanding.  CTN offered RPM, patient accepted.    Plan of care updates since last contact:  Education: COVID quarantine guidelines [facility]  Review of patient management of conditions/medications: .       Advance Care Planning:   Does patient have an Advance Directive: reviewed during previous call, see note. .    Medication Review:  No changes since last call.     Remote Patient Monitoring:  Offered patient enrollment in the Remote Patient Monitoring (RPM) program for in-home monitoring: Yes, patient enrolled today:     Remote Patient Monitoring Enrollment Note    Date/Time:  10/2/2024 12:59 PM    Offered patient enrollment in the [facility] Remote Patient Monitoring (RPM) program for in home monitoring for COVID; condition managed by Dr. Rojelio Bell. COPD; condition managed by Dr. Rojelio Bell.  Patient accepted.    Patient will be monitoring the following daily:  Blood Pressure, Pulse ox, and Survey questions    CTN reviewed the information below with the patient:    Emergency Contact (name and contact number): Fish Wild [phone]    [x]  A member from the care coordination team 
Eat healthy foods you enjoy. Apixaban/Eliquis DOES NOT have a special diet. Limit your alcohol intake.